# Patient Record
Sex: FEMALE | Race: WHITE | NOT HISPANIC OR LATINO | Employment: UNEMPLOYED | URBAN - NONMETROPOLITAN AREA
[De-identification: names, ages, dates, MRNs, and addresses within clinical notes are randomized per-mention and may not be internally consistent; named-entity substitution may affect disease eponyms.]

---

## 2017-02-06 ENCOUNTER — TRANSCRIBE ORDERS (OUTPATIENT)
Dept: ADMINISTRATIVE | Facility: HOSPITAL | Age: 47
End: 2017-02-06

## 2017-02-06 ENCOUNTER — HOSPITAL ENCOUNTER (OUTPATIENT)
Dept: GENERAL RADIOLOGY | Facility: HOSPITAL | Age: 47
Discharge: HOME | End: 2017-02-06
Admitting: NURSE PRACTITIONER

## 2017-02-06 DIAGNOSIS — M25.562 ACUTE PAIN OF LEFT KNEE: Primary | ICD-10-CM

## 2017-02-06 DIAGNOSIS — M79.605 LEFT LEG PAIN: ICD-10-CM

## 2017-02-06 PROCEDURE — 73562 X-RAY EXAM OF KNEE 3: CPT

## 2017-02-06 PROCEDURE — 73590 X-RAY EXAM OF LOWER LEG: CPT

## 2017-03-23 ENCOUNTER — TRANSCRIBE ORDERS (OUTPATIENT)
Dept: NUCLEAR MEDICINE | Facility: HOSPITAL | Age: 47
End: 2017-03-23

## 2017-03-23 DIAGNOSIS — Z96.652 PRESENCE OF LEFT ARTIFICIAL KNEE JOINT: Primary | ICD-10-CM

## 2017-04-20 ENCOUNTER — TRANSCRIBE ORDERS (OUTPATIENT)
Dept: MAMMOGRAPHY | Facility: HOSPITAL | Age: 47
End: 2017-04-20

## 2017-04-20 DIAGNOSIS — Z13.9 SCREENING: Primary | ICD-10-CM

## 2017-04-27 ENCOUNTER — TRANSCRIBE ORDERS (OUTPATIENT)
Dept: CT IMAGING | Facility: HOSPITAL | Age: 47
End: 2017-04-27

## 2017-04-27 ENCOUNTER — HOSPITAL ENCOUNTER (OUTPATIENT)
Dept: CT IMAGING | Facility: HOSPITAL | Age: 47
Discharge: HOME | End: 2017-04-27
Admitting: NURSE PRACTITIONER

## 2017-04-27 DIAGNOSIS — R10.84 ABDOMINAL PAIN, GENERALIZED: Primary | ICD-10-CM

## 2017-04-27 PROCEDURE — 74177 CT ABD & PELVIS W/CONTRAST: CPT

## 2017-04-27 PROCEDURE — 0 DIATRIZOATE MEGLUMINE & SODIUM PER 1 ML: Performed by: NURSE PRACTITIONER

## 2017-04-27 PROCEDURE — 0 IOPAMIDOL 61 % SOLUTION: Performed by: NURSE PRACTITIONER

## 2017-04-27 RX ADMIN — IOPAMIDOL 100 ML: 612 INJECTION, SOLUTION INTRAVENOUS at 16:15

## 2017-04-27 RX ADMIN — DIATRIZOATE MEGLUMINE AND DIATRIZOATE SODIUM 30 ML: 660; 100 LIQUID ORAL; RECTAL at 16:15

## 2017-05-09 ENCOUNTER — OFFICE VISIT (OUTPATIENT)
Dept: GASTROENTEROLOGY | Facility: CLINIC | Age: 47
End: 2017-05-09

## 2017-05-09 VITALS
DIASTOLIC BLOOD PRESSURE: 91 MMHG | SYSTOLIC BLOOD PRESSURE: 146 MMHG | TEMPERATURE: 97.8 F | BODY MASS INDEX: 42.86 KG/M2 | HEART RATE: 91 BPM | WEIGHT: 227 LBS | HEIGHT: 61 IN | RESPIRATION RATE: 16 BRPM

## 2017-05-09 DIAGNOSIS — R93.2 ABNORMAL CT OF LIVER: Chronic | ICD-10-CM

## 2017-05-09 DIAGNOSIS — R10.32 LEFT LOWER QUADRANT PAIN: ICD-10-CM

## 2017-05-09 DIAGNOSIS — R19.7 DIARRHEA, UNSPECIFIED TYPE: Chronic | ICD-10-CM

## 2017-05-09 DIAGNOSIS — R10.13 EPIGASTRIC PAIN: Primary | ICD-10-CM

## 2017-05-09 DIAGNOSIS — R12 HEARTBURN: Chronic | ICD-10-CM

## 2017-05-09 DIAGNOSIS — R11.0 NAUSEA: ICD-10-CM

## 2017-05-09 PROCEDURE — 99244 OFF/OP CNSLTJ NEW/EST MOD 40: CPT | Performed by: NURSE PRACTITIONER

## 2017-05-09 RX ORDER — ONDANSETRON 4 MG/1
4 TABLET, FILM COATED ORAL EVERY 8 HOURS PRN
COMMUNITY
End: 2020-04-14

## 2017-05-09 RX ORDER — HYDROCODONE BITARTRATE AND ACETAMINOPHEN 7.5; 325 MG/1; MG/1
1 TABLET ORAL EVERY 6 HOURS PRN
COMMUNITY
End: 2020-04-14

## 2017-05-09 RX ORDER — PROMETHAZINE HYDROCHLORIDE 25 MG/1
25 TABLET ORAL EVERY 6 HOURS PRN
COMMUNITY
End: 2020-04-14

## 2017-05-09 RX ORDER — CIPROFLOXACIN 500 MG/1
500 TABLET, FILM COATED ORAL 2 TIMES DAILY
Qty: 14 TABLET | Refills: 0 | Status: SHIPPED | OUTPATIENT
Start: 2017-05-09 | End: 2017-05-16

## 2017-05-09 RX ORDER — METRONIDAZOLE 250 MG/1
250 TABLET ORAL 4 TIMES DAILY
Qty: 28 TABLET | Refills: 0 | Status: SHIPPED | OUTPATIENT
Start: 2017-05-09 | End: 2017-05-16

## 2017-05-09 RX ORDER — OMEPRAZOLE 20 MG/1
20 CAPSULE, DELAYED RELEASE ORAL DAILY
COMMUNITY
End: 2020-04-14

## 2017-05-10 ENCOUNTER — PREP FOR SURGERY (OUTPATIENT)
Dept: GASTROENTEROLOGY | Facility: CLINIC | Age: 47
End: 2017-05-10

## 2017-05-10 DIAGNOSIS — R12 HEARTBURN: ICD-10-CM

## 2017-05-10 DIAGNOSIS — R19.7 DIARRHEA, UNSPECIFIED TYPE: ICD-10-CM

## 2017-05-10 DIAGNOSIS — R11.0 NAUSEA: ICD-10-CM

## 2017-05-10 DIAGNOSIS — R10.32 LEFT LOWER QUADRANT PAIN: Primary | ICD-10-CM

## 2017-05-10 DIAGNOSIS — R10.13 EPIGASTRIC PAIN: Primary | ICD-10-CM

## 2017-05-10 RX ORDER — SODIUM CHLORIDE 9 MG/ML
70 INJECTION, SOLUTION INTRAVENOUS CONTINUOUS PRN
Status: CANCELLED | OUTPATIENT
Start: 2017-05-10

## 2017-05-10 RX ORDER — SODIUM CHLORIDE 0.9 % (FLUSH) 0.9 %
1-10 SYRINGE (ML) INJECTION AS NEEDED
Status: CANCELLED | OUTPATIENT
Start: 2017-05-10

## 2017-05-11 ENCOUNTER — HOSPITAL ENCOUNTER (OUTPATIENT)
Facility: HOSPITAL | Age: 47
Setting detail: HOSPITAL OUTPATIENT SURGERY
End: 2017-05-11
Attending: INTERNAL MEDICINE | Admitting: INTERNAL MEDICINE

## 2017-05-13 ENCOUNTER — HOSPITAL ENCOUNTER (OUTPATIENT)
Dept: MAMMOGRAPHY | Facility: HOSPITAL | Age: 47
Discharge: HOME | End: 2017-05-13

## 2017-05-15 ENCOUNTER — HOSPITAL ENCOUNTER (OUTPATIENT)
Dept: MAMMOGRAPHY | Facility: HOSPITAL | Age: 47
Discharge: HOME | End: 2017-05-15
Admitting: NURSE PRACTITIONER

## 2017-05-15 ENCOUNTER — TELEPHONE (OUTPATIENT)
Dept: GASTROENTEROLOGY | Facility: CLINIC | Age: 47
End: 2017-05-15

## 2017-05-15 DIAGNOSIS — Z13.9 SCREENING: ICD-10-CM

## 2017-05-15 PROCEDURE — G0202 SCR MAMMO BI INCL CAD: HCPCS

## 2017-05-15 PROCEDURE — 77063 BREAST TOMOSYNTHESIS BI: CPT

## 2017-05-23 VITALS — WEIGHT: 227 LBS | HEIGHT: 61 IN | BODY MASS INDEX: 42.86 KG/M2

## 2017-05-23 RX ORDER — ALBUTEROL SULFATE 90 UG/1
2 AEROSOL, METERED RESPIRATORY (INHALATION) EVERY 4 HOURS PRN
COMMUNITY
End: 2018-11-28 | Stop reason: HOSPADM

## 2017-06-14 DIAGNOSIS — N20.0 KIDNEY STONE: Primary | ICD-10-CM

## 2017-08-15 ENCOUNTER — HOSPITAL ENCOUNTER (EMERGENCY)
Facility: HOSPITAL | Age: 47
Discharge: HOME | End: 2017-08-15
Attending: EMERGENCY MEDICINE | Admitting: EMERGENCY MEDICINE

## 2017-08-15 ENCOUNTER — APPOINTMENT (OUTPATIENT)
Dept: GENERAL RADIOLOGY | Facility: HOSPITAL | Age: 47
End: 2017-08-15

## 2017-08-15 VITALS
DIASTOLIC BLOOD PRESSURE: 78 MMHG | SYSTOLIC BLOOD PRESSURE: 127 MMHG | RESPIRATION RATE: 18 BRPM | HEART RATE: 84 BPM | WEIGHT: 215 LBS | BODY MASS INDEX: 40.59 KG/M2 | OXYGEN SATURATION: 98 % | TEMPERATURE: 98.5 F | HEIGHT: 61 IN

## 2017-08-15 DIAGNOSIS — M25.571 RIGHT ANKLE PAIN, UNSPECIFIED CHRONICITY: Primary | ICD-10-CM

## 2017-08-15 PROCEDURE — 99283 EMERGENCY DEPT VISIT LOW MDM: CPT

## 2017-08-15 PROCEDURE — 73610 X-RAY EXAM OF ANKLE: CPT

## 2017-08-15 RX ORDER — MELOXICAM 7.5 MG/1
7.5 TABLET ORAL DAILY
Qty: 14 TABLET | Refills: 0 | Status: SHIPPED | OUTPATIENT
Start: 2017-08-15 | End: 2020-04-14

## 2017-08-15 NOTE — ED PROVIDER NOTES
Subjective   History of Present Illness  This is a 47-year-old female who comes in today complaining of right foot pain.  She states that she twisted her foot approximately one week ago and the pain has coming increasingly worse over the past few days radiating up into her ankle and into her thigh.  She denies any other injury.  Review of Systems   Constitutional: Negative.    HENT: Negative.    Eyes: Negative.    Respiratory: Negative.    Cardiovascular: Negative.    Gastrointestinal: Negative.    Genitourinary: Negative.    Musculoskeletal: Positive for joint swelling and myalgias.   Skin: Negative.    Neurological: Negative.    Psychiatric/Behavioral: Negative.    All other systems reviewed and are negative.      Past Medical History:   Diagnosis Date   • Arthritis    • B12 deficiency    • Back pain    • Bronchitis    • Depression    • Diverticulosis    • Fatty liver    • Generalized headaches    • GERD (gastroesophageal reflux disease)    • High cholesterol    • Pancreatitis    • Pneumonia    • Wears dentures     UPPER       No Known Allergies    Past Surgical History:   Procedure Laterality Date   • APPENDECTOMY     • CHOLECYSTECTOMY     • DIAGNOSTIC LAPAROSCOPY     • HYSTERECTOMY  2017   • REPLACEMENT TOTAL KNEE      left   • SALPINGO OOPHORECTOMY Left    • UPPER GASTROINTESTINAL ENDOSCOPY  27 years ago       Family History   Problem Relation Age of Onset   • Liver disease Father    • Kidney cancer Father    • Colon cancer Maternal Grandfather        Social History     Social History   • Marital status:      Spouse name: N/A   • Number of children: N/A   • Years of education: N/A     Social History Main Topics   • Smoking status: Current Every Day Smoker     Packs/day: 0.50     Years: 17.00     Types: Cigarettes   • Smokeless tobacco: Never Used   • Alcohol use Yes      Comment: social   • Drug use: No   • Sexual activity: Defer     Other Topics Concern   • None     Social History Narrative            Objective   Physical Exam   Constitutional: She appears well-developed and well-nourished.   Nursing note and vitals reviewed.  GEN: No acute distress  Head: Normocephalic, atraumatic  Eyes: Pupils equal round reactive to light  ENT: Posterior pharynx normal in appearance, oral mucosa is moist  Chest: Nontender to palpation  Cardiovascular: Regular rate  Lungs: Clear to auscultation bilaterally  Abdomen: Soft, nontender, nondistended, no peritoneal signs  Extremities: No edema, normal appearance tender to lateral aspect of foot at 5th metacarpal.   Neuro: GCS 15  Psych: Mood and affect are appropriate      Procedures         ED Course  ED Course                  MDM  Number of Diagnoses or Management Options  Right ankle pain, unspecified chronicity:   Diagnosis management comments: Patient does report limping I do feel that the change in her gait has caused the tenderness to her calf and thigh and she does agree with this theory.  We will go ahead and put her in a boot as her x-ray is negative and give her some anti-inflammatories at this time.  Can have her follow up with her primary care provider      Final diagnoses:   Right ankle pain, unspecified chronicity            Mirna Oglesby, APRN  08/15/17 7318

## 2018-04-02 ENCOUNTER — APPOINTMENT (OUTPATIENT)
Dept: CT IMAGING | Facility: HOSPITAL | Age: 48
End: 2018-04-02

## 2018-04-02 ENCOUNTER — HOSPITAL ENCOUNTER (EMERGENCY)
Facility: HOSPITAL | Age: 48
Discharge: HOME | End: 2018-04-02
Attending: EMERGENCY MEDICINE | Admitting: EMERGENCY MEDICINE

## 2018-04-02 VITALS
DIASTOLIC BLOOD PRESSURE: 84 MMHG | HEART RATE: 70 BPM | HEIGHT: 61 IN | OXYGEN SATURATION: 98 % | BODY MASS INDEX: 33.99 KG/M2 | RESPIRATION RATE: 18 BRPM | SYSTOLIC BLOOD PRESSURE: 122 MMHG | TEMPERATURE: 98 F | WEIGHT: 180 LBS

## 2018-04-02 DIAGNOSIS — D23.4 CYST, DERMOID, SCALP AND NECK: Primary | ICD-10-CM

## 2018-04-02 PROCEDURE — 70450 CT HEAD/BRAIN W/O DYE: CPT

## 2018-04-02 PROCEDURE — 99283 EMERGENCY DEPT VISIT LOW MDM: CPT

## 2018-04-02 RX ORDER — IBUPROFEN 800 MG/1
800 TABLET ORAL ONCE
Status: COMPLETED | OUTPATIENT
Start: 2018-04-02 | End: 2018-04-02

## 2018-04-02 RX ORDER — CEPHALEXIN 500 MG/1
500 CAPSULE ORAL 3 TIMES DAILY
Qty: 21 CAPSULE | Refills: 0 | Status: SHIPPED | OUTPATIENT
Start: 2018-04-02 | End: 2018-04-09

## 2018-04-02 RX ADMIN — IBUPROFEN 800 MG: 800 TABLET, FILM COATED ORAL at 17:32

## 2018-05-08 ENCOUNTER — TRANSCRIBE ORDERS (OUTPATIENT)
Dept: ADMINISTRATIVE | Facility: HOSPITAL | Age: 48
End: 2018-05-08

## 2018-05-08 DIAGNOSIS — Z12.39 SCREENING BREAST EXAMINATION: Primary | ICD-10-CM

## 2018-06-11 NOTE — ED PROVIDER NOTES
Subjective   47-year-old female presents with pain in the back of her head.  She has a knot in the left back of her head that she's noticed for 20 days.  It's tender to touch.  The pain is worse with movement.  No fever or chills.  No injury to the area.        History provided by:  Patient   used: No        Review of Systems   Neurological: Positive for headaches.   All other systems reviewed and are negative.      Past Medical History:   Diagnosis Date   • Arthritis    • B12 deficiency    • Back pain    • Bronchitis    • Depression    • Diverticulosis    • Fatty liver    • Generalized headaches    • GERD (gastroesophageal reflux disease)    • High cholesterol    • Pancreatitis    • Pneumonia    • Wears dentures     UPPER       No Known Allergies    Past Surgical History:   Procedure Laterality Date   • APPENDECTOMY     • CHOLECYSTECTOMY     • DIAGNOSTIC LAPAROSCOPY     • HYSTERECTOMY  2017   • REPLACEMENT TOTAL KNEE      left   • SALPINGO OOPHORECTOMY Left    • UPPER GASTROINTESTINAL ENDOSCOPY  27 years ago       Family History   Problem Relation Age of Onset   • Liver disease Father    • Kidney cancer Father    • Colon cancer Maternal Grandfather        Social History     Social History   • Marital status:      Social History Main Topics   • Smoking status: Current Every Day Smoker     Packs/day: 0.50     Years: 17.00     Types: Cigarettes   • Smokeless tobacco: Never Used   • Alcohol use Yes      Comment: social   • Drug use: No   • Sexual activity: Defer     Other Topics Concern   • Not on file           Objective   Physical Exam   Constitutional: She is oriented to person, place, and time. She appears well-developed and well-nourished.   Eyes: EOM are normal.   Neck: Normal range of motion. Neck supple.   Cardiovascular: Normal rate and regular rhythm.    Pulmonary/Chest: Effort normal and breath sounds normal.   Abdominal: Soft. Bowel sounds are normal.   Musculoskeletal: Normal  range of motion.   Neurological: She is alert and oriented to person, place, and time. She has normal reflexes.   Skin:   2 cm cyst on the left occipital area.  Tender to palpation.   Psychiatric: She has a normal mood and affect.   Nursing note and vitals reviewed.      Procedures         ED Course  ED Course                  MDM    Final diagnoses:   Cyst, dermoid, scalp and neck            Leon Laureano Jr., PA-C  04/02/18 [id number]     [phone]

## 2018-07-30 ENCOUNTER — HOSPITAL ENCOUNTER (EMERGENCY)
Facility: HOSPITAL | Age: 48
Discharge: LEFT BEFORE DISCHARGE | End: 2018-07-30

## 2018-07-30 VITALS
WEIGHT: 175 LBS | RESPIRATION RATE: 18 BRPM | HEIGHT: 61 IN | HEART RATE: 91 BPM | SYSTOLIC BLOOD PRESSURE: 122 MMHG | TEMPERATURE: 98.8 F | BODY MASS INDEX: 33.04 KG/M2 | DIASTOLIC BLOOD PRESSURE: 78 MMHG | OXYGEN SATURATION: 98 %

## 2018-07-31 ENCOUNTER — TRANSCRIBE ORDERS (OUTPATIENT)
Dept: ULTRASOUND IMAGING | Facility: HOSPITAL | Age: 48
End: 2018-07-31

## 2018-07-31 ENCOUNTER — HOSPITAL ENCOUNTER (OUTPATIENT)
Dept: ULTRASOUND IMAGING | Facility: HOSPITAL | Age: 48
Discharge: HOME | End: 2018-07-31
Admitting: NURSE PRACTITIONER

## 2018-07-31 DIAGNOSIS — R60.0 LEG EDEMA, RIGHT: ICD-10-CM

## 2018-07-31 DIAGNOSIS — R60.0 LEG EDEMA, RIGHT: Primary | ICD-10-CM

## 2018-07-31 PROCEDURE — 93971 EXTREMITY STUDY: CPT

## 2020-04-14 ENCOUNTER — OFFICE VISIT (OUTPATIENT)
Dept: CARDIOLOGY | Facility: CLINIC | Age: 50
End: 2020-04-14

## 2020-04-14 VITALS
BODY MASS INDEX: 40.75 KG/M2 | DIASTOLIC BLOOD PRESSURE: 90 MMHG | TEMPERATURE: 98.2 F | HEIGHT: 61 IN | HEART RATE: 91 BPM | OXYGEN SATURATION: 96 % | WEIGHT: 215.8 LBS | SYSTOLIC BLOOD PRESSURE: 137 MMHG

## 2020-04-14 DIAGNOSIS — R00.2 PALPITATIONS: ICD-10-CM

## 2020-04-14 DIAGNOSIS — R07.89 OTHER CHEST PAIN: Primary | ICD-10-CM

## 2020-04-14 DIAGNOSIS — R60.9 PERIPHERAL EDEMA: ICD-10-CM

## 2020-04-14 DIAGNOSIS — Z00.00 HEALTHCARE MAINTENANCE: ICD-10-CM

## 2020-04-14 DIAGNOSIS — F17.200 SMOKING: ICD-10-CM

## 2020-04-14 DIAGNOSIS — E78.5 HYPERLIPIDEMIA, UNSPECIFIED HYPERLIPIDEMIA TYPE: ICD-10-CM

## 2020-04-14 DIAGNOSIS — R06.02 SHORTNESS OF BREATH: ICD-10-CM

## 2020-04-14 DIAGNOSIS — Z82.49 FAMILY HISTORY OF EARLY CAD: ICD-10-CM

## 2020-04-14 DIAGNOSIS — R42 LIGHTHEADEDNESS: ICD-10-CM

## 2020-04-14 DIAGNOSIS — E66.01 MORBIDLY OBESE (HCC): ICD-10-CM

## 2020-04-14 PROBLEM — IMO0001 SMOKING: Status: ACTIVE | Noted: 2020-04-14

## 2020-04-14 PROBLEM — J45.909 ASTHMA: Status: ACTIVE | Noted: 2020-04-14

## 2020-04-14 PROBLEM — R73.03 BORDERLINE TYPE 2 DIABETES MELLITUS: Status: ACTIVE | Noted: 2020-04-14

## 2020-04-14 PROBLEM — R60.0 PERIPHERAL EDEMA: Status: ACTIVE | Noted: 2020-04-14

## 2020-04-14 PROCEDURE — 99204 OFFICE O/P NEW MOD 45 MIN: CPT | Performed by: NURSE PRACTITIONER

## 2020-04-14 RX ORDER — ATORVASTATIN CALCIUM 40 MG/1
40 TABLET, FILM COATED ORAL DAILY
Qty: 90 TABLET | Refills: 3 | Status: SHIPPED | OUTPATIENT
Start: 2020-04-14 | End: 2022-07-22

## 2020-04-14 RX ORDER — NITROGLYCERIN 0.4 MG/1
TABLET SUBLINGUAL
Qty: 30 TABLET | Refills: 5 | Status: SHIPPED | OUTPATIENT
Start: 2020-04-14 | End: 2022-08-10 | Stop reason: SDUPTHER

## 2020-04-14 RX ORDER — IBUPROFEN 800 MG/1
800 TABLET ORAL 2 TIMES DAILY PRN
COMMUNITY
Start: 2020-01-21 | End: 2022-07-22

## 2020-04-14 NOTE — PATIENT INSTRUCTIONS
"BMI for Adults    Body mass index (BMI) is a number that is calculated from a person's weight and height. BMI may help to estimate how much of a person's weight is composed of fat. BMI can help identify those who may be at higher risk for certain medical problems.  How is BMI used with adults?  BMI is used as a screening tool to identify possible weight problems. It is used to check whether a person is obese, overweight, healthy weight, or underweight.  How is BMI calculated?  BMI measures your weight and compares it to your height. This can be done either in English ([place] or metric measurements. Note that charts are available to help you find your BMI quickly and easily without having to do these calculations yourself.  To calculate your BMI in English ([place] measurements, your health care provider will:  1. Measure your weight in pounds (lb).  2. Multiply the number of pounds by 703.  ? For example, for a person who weighs 180 lb, multiply that number by 703, which equals 126,540.  3. Measure your height in inches (in). Then multiply that number by itself to get a measurement called \"inches squared.\"  ? For example, for a person who is 70 in tall, the \"inches squared\" measurement is 70 in x 70 in, which equals 4900 inches squared.  4. Divide the total from Step 2 (number of lb x 703) by the total from Step 3 (inches squared): 126,540 ÷ 4900 = 25.8. This is your BMI.  To calculate your BMI in metric measurements, your health care provider will:  1. Measure your weight in kilograms (kg).  2. Measure your height in meters (m). Then multiply that number by itself to get a measurement called \"meters squared.\"  ? For example, for a person who is 1.75 m tall, the \"meters squared\" measurement is 1.75 m x 1.75 m, which is equal to 3.1 meters squared.  3. Divide the number of kilograms (your weight) by the meters squared number. In this example: 70 ÷ 3.1 = 22.6. This is your BMI.  How is BMI interpreted?  To interpret your " results, your health care provider will use BMI charts to identify whether you are underweight, normal weight, overweight, or obese. The following guidelines will be used:  · Underweight: BMI less than 18.5.  · Normal weight: BMI between 18.5 and 24.9.  · Overweight: BMI between 25 and 29.9.  · Obese: BMI of 30 and above.  Please note:  · Weight includes both fat and muscle, so someone with a muscular build, such as an athlete, may have a BMI that is higher than 24.9. In cases like these, BMI is not an accurate measure of body fat.  · To determine if excess body fat is the cause of a BMI of 25 or higher, further assessments may need to be done by a health care provider.  · BMI is usually interpreted in the same way for men and women.  Why is BMI a useful tool?  BMI is useful in two ways:  · Identifying a weight problem that may be related to a medical condition, or that may increase the risk for medical problems.  · Promoting lifestyle and diet changes in order to reach a healthy weight.  Summary  · Body mass index (BMI) is a number that is calculated from a person's weight and height.  · BMI may help to estimate how much of a person's weight is composed of fat. BMI can help identify those who may be at higher risk for certain medical problems.  · BMI can be measured using English measurements or metric measurements.  · To interpret your results, your health care provider will use BMI charts to identify whether you are underweight, normal weight, overweight, or obese.  This information is not intended to replace advice given to you by your health care provider. Make sure you discuss any questions you have with your health care provider.  Document Released: 08/29/2005 Document Revised: 10/31/2018 Document Reviewed: 10/31/2018  [employer] Interactive Patient Education © 2020 [employer]  For more information:    Quit Now [place]  1-800-QUIT-NOW  https://[place].quitlogix.org/en-[place]/  Steps to Quit Smoking  Smoking  tobacco can be harmful to your health and can affect almost every organ in your body. Smoking puts you, and those around you, at risk for developing many serious chronic diseases. Quitting smoking is difficult, but it is one of the best things that you can do for your health. It is never too late to quit.  What are the benefits of quitting smoking?  When you quit smoking, you lower your risk of developing serious diseases and conditions, such as:  · Lung cancer or lung disease, such as COPD.  · Heart disease.  · Stroke.  · Heart attack.  · Infertility.  · Osteoporosis and bone fractures.  Additionally, symptoms such as coughing, wheezing, and shortness of breath may get better when you quit. You may also find that you get sick less often because your body is stronger at fighting off colds and infections. If you are pregnant, quitting smoking can help to reduce your chances of having a baby of low birth weight.  How do I get ready to quit?  When you decide to quit smoking, create a plan to make sure that you are successful. Before you quit:  · Pick a date to quit. Set a date within the next two weeks to give you time to prepare.  · Write down the reasons why you are quitting. Keep this list in places where you will see it often, such as on your bathroom mirror or in your car or wallet.  · Identify the people, places, things, and activities that make you want to smoke (triggers) and avoid them. Make sure to take these actions:  ¨ Throw away all cigarettes at home, at work, and in your car.  ¨ Throw away smoking accessories, such as ashtrays and lighters.  ¨ Clean your car and make sure to empty the ashtray.  ¨ Clean your home, including curtains and carpets.  · Tell your family, friends, and coworkers that you are quitting. Support from your loved ones can make quitting easier.  · Talk with your health care provider about your options for quitting smoking.  · Find out what treatment options are covered by your health  insurance.  What strategies can I use to quit smoking?  Talk with your healthcare provider about different strategies to quit smoking. Some strategies include:  · Quitting smoking altogether instead of gradually lessening how much you smoke over a period of time. Research shows that quitting “cold turkey” is more successful than gradually quitting.  · Attending in-person counseling to help you build problem-solving skills. You are more likely to have success in quitting if you attend several counseling sessions. Even short sessions of 10 minutes can be effective.  · Finding resources and support systems that can help you to quit smoking and remain smoke-free after you quit. These resources are most helpful when you use them often. They can include:  ¨ Online chats with a counselor.  ¨ Telephone quitlines.  ¨ Printed self-help materials.  ¨ Support groups or group counseling.  ¨ Text messaging programs.  ¨ Mobile phone applications.  · Taking medicines to help you quit smoking. (If you are pregnant or breastfeeding, talk with your health care provider first.) Some medicines contain nicotine and some do not. Both types of medicines help with cravings, but the medicines that include nicotine help to relieve withdrawal symptoms. Your health care provider may recommend:  ¨ Nicotine patches, gum, or lozenges.  ¨ Nicotine inhalers or sprays.  ¨ Non-nicotine medicine that is taken by mouth.  Talk with your health care provider about combining strategies, such as taking medicines while you are also receiving in-person counseling. Using these two strategies together makes you more likely to succeed in quitting than if you used either strategy on its own.  If you are pregnant or breastfeeding, talk with your health care provider about finding counseling or other support strategies to quit smoking. Do not take medicine to help you quit smoking unless told to do so by your health care provider.  What things can I do to make it  easier to quit?  Quitting smoking might feel overwhelming at first, but there is a lot that you can do to make it easier. Take these important actions:  · Reach out to your family and friends and ask that they support and encourage you during this time. Call telephone quitlines, reach out to support groups, or work with a counselor for support.  · Ask people who smoke to avoid smoking around you.  · Avoid places that trigger you to smoke, such as bars, parties, or smoke-break areas at work.  · Spend time around people who do not smoke.  · Lessen stress in your life, because stress can be a smoking trigger for some people. To lessen stress, try:  ¨ Exercising regularly.  ¨ Deep-breathing exercises.  ¨ Yoga.  ¨ Meditating.  ¨ Performing a body scan. This involves closing your eyes, scanning your body from head to toe, and noticing which parts of your body are particularly tense. Purposefully relax the muscles in those areas.  · Download or purchase mobile phone or tablet apps (applications) that can help you stick to your quit plan by providing reminders, tips, and encouragement. There are many free apps, such as QuitGuide from the CDC (Centers for Disease Control and Prevention). You can find other support for quitting smoking (smoking cessation) through smokefree.gov and other websites.  How will I feel when I quit smoking?  Within the first 24 hours of quitting smoking, you may start to feel some withdrawal symptoms. These symptoms are usually most noticeable 2-3 days after quitting, but they usually do not last beyond 2-3 weeks. Changes or symptoms that you might experience include:  · Mood swings.  · Restlessness, anxiety, or irritation.  · Difficulty concentrating.  · Dizziness.  · Strong cravings for sugary foods in addition to nicotine.  · Mild weight gain.  · Constipation.  · Nausea.  · Coughing or a sore throat.  · Changes in how your medicines work in your body.  · A depressed mood.  · Difficulty sleeping  (insomnia).  After the first 2-3 weeks of quitting, you may start to notice more positive results, such as:  · Improved sense of smell and taste.  · Decreased coughing and sore throat.  · Slower heart rate.  · Lower blood pressure.  · Clearer skin.  · The ability to breathe more easily.  · Fewer sick days.  Quitting smoking is very challenging for most people. Do not get discouraged if you are not successful the first time. Some people need to make many attempts to quit before they achieve long-term success. Do your best to stick to your quit plan, and talk with your health care provider if you have any questions or concerns.  This information is not intended to replace advice given to you by your health care provider. Make sure you discuss any questions you have with your health care provider.  Document Released: 12/12/2002 Document Revised: 08/15/2017 Document Reviewed: 05/03/2016  [employer] Interactive Patient Education © 2017 [employer]    Nonspecific Chest Pain  Chest pain can be caused by many different conditions. Some causes of chest pain can be life-threatening. These will require treatment right away. Serious causes of chest pain include:  · Heart attack.  · A tear in the body's main blood vessel.  · Redness and swelling (inflammation) around your heart.  · Blood clot in your lungs.  Other causes of chest pain may not be so serious. These include:  · Heartburn.  · Anxiety or stress.  · Damage to bones or muscles in your chest.  · Lung infections.  Chest pain can feel like:  · Pain or discomfort in your chest.  · Crushing, pressure, aching, or squeezing pain.  · Burning or tingling.  · Dull or sharp pain that is worse when you move, cough, or take a deep breath.  · Pain or discomfort that is also felt in your back, neck, jaw, shoulder, or arm, or pain that spreads to any of these areas.  It is hard to know whether your pain is caused by something that is serious or something that is not so serious. So it  is important to see your doctor right away if you have chest pain.  Follow these instructions at home:  Medicines  · Take over-the-counter and prescription medicines only as told by your doctor.  · If you were prescribed an antibiotic medicine, take it as told by your doctor. Do not stop taking the antibiotic even if you start to feel better.  Lifestyle    · Rest as told by your doctor.  · Do not use any products that contain nicotine or tobacco, such as cigarettes, e-cigarettes, and chewing tobacco. If you need help quitting, ask your doctor.  · Do not drink alcohol.  · Make lifestyle changes as told by your doctor. These may include:  ? Getting regular exercise. Ask your doctor what activities are safe for you.  ? Eating a heart-healthy diet. A diet and nutrition specialist (dietitian) can help you to learn healthy eating options.  ? Staying at a healthy weight.  ? Treating diabetes or high blood pressure, if needed.  ? Lowering your stress. Activities such as yoga and relaxation techniques can help.  General instructions  · Pay attention to any changes in your symptoms. Tell your doctor about them or any new symptoms.  · Avoid any activities that cause chest pain.  · Keep all follow-up visits as told by your doctor. This is important. You may need more testing if your chest pain does not go away.  Contact a doctor if:  · Your chest pain does not go away.  · You feel depressed.  · You have a fever.  Get help right away if:  · Your chest pain is worse.  · You have a cough that gets worse, or you cough up blood.  · You have very bad (severe) pain in your belly (abdomen).  · You pass out (faint).  · You have either of these for no clear reason:  ? Sudden chest discomfort.  ? Sudden discomfort in your arms, back, neck, or jaw.  · You have shortness of breath at any time.  · You suddenly start to sweat, or your skin gets clammy.  · You feel sick to your stomach (nauseous).  · You throw up (vomit).  · You suddenly feel  lightheaded or dizzy.  · You feel very weak or tired.  · Your heart starts to beat fast, or it feels like it is skipping beats.  These symptoms may be an emergency. Do not wait to see if the symptoms will go away. Get medical help right away. Call your local emergency services (911 in the [place] Do not drive yourself to the hospital.  Summary  · Chest pain can be caused by many different conditions. The cause may be serious and need treatment right away. If you have chest pain, see your doctor right away.  · Follow your doctor's instructions for taking medicines and making lifestyle changes.  · Keep all follow-up visits as told by your doctor. This includes visits for any further testing if your chest pain does not go away.  · Be sure to know the signs that show that your condition has become worse. Get help right away if you have these symptoms.  This information is not intended to replace advice given to you by your health care provider. Make sure you discuss any questions you have with your health care provider.  Document Released: 06/05/2009 Document Revised: 06/20/2019 Document Reviewed: 06/20/2019  [employer] Interactive Patient Education © 2020 [employer]    Palpitations  Palpitations are feelings that your heartbeat is irregular or is faster than normal. It may feel like your heart is fluttering or skipping a beat. Palpitations are usually not a serious problem. They may be caused by many things, including smoking, caffeine, alcohol, stress, and certain medicines or drugs. Most causes of palpitations are not serious. However, some palpitations can be a sign of a serious problem. You may need further tests to rule out serious medical problems.  Follow these instructions at home:         Pay attention to any changes in your condition. Take these actions to help manage your symptoms:  Eating and drinking  · Avoid foods and drinks that may cause palpitations. These may include:  ? Caffeinated coffee, tea, soft  drinks, diet pills, and energy drinks.  ? Chocolate.  ? Alcohol.  Lifestyle  · Take steps to reduce your stress and anxiety. Things that can help you relax include:  ? Yoga.  ? Mind-body activities, such as deep breathing, meditation, or using words and images to create positive thoughts (guided imagery).  ? Physical activity, such as swimming, jogging, or walking. Tell your health care provider if your palpitations increase with activity. If you have chest pain or shortness of breath with activity, do not continue the activity until you are seen by your health care provider.  ? Biofeedback. This is a method that helps you learn to use your mind to control things in your body, such as your heartbeat.  · Do not use drugs, including cocaine or ecstasy. Do not use marijuana.  · Get plenty of rest and sleep. Keep a regular bed time.  General instructions  · Take over-the-counter and prescription medicines only as told by your health care provider.  · Do not use any products that contain nicotine or tobacco, such as cigarettes and e-cigarettes. If you need help quitting, ask your health care provider.  · Keep all follow-up visits as told by your health care provider. This is important. These may include visits for further testing if palpitations do not go away or get worse.  Contact a health care provider if you:  · Continue to have a fast or irregular heartbeat after 24 hours.  · Notice that your palpitations occur more often.  Get help right away if you:  · Have chest pain or shortness of breath.  · Have a severe headache.  · Feel dizzy or you faint.  Summary  · Palpitations are feelings that your heartbeat is irregular or is faster than normal. It may feel like your heart is fluttering or skipping a beat.  · Palpitations may be caused by many things, including smoking, caffeine, alcohol, stress, certain medicines, and drugs.  · Although most causes of palpitations are not serious, some causes can be a sign of a  serious medical problem.  · Get help right away if you faint or have chest pain, shortness of breath, a severe headache, or dizziness.  This information is not intended to replace advice given to you by your health care provider. Make sure you discuss any questions you have with your health care provider.  Document Released: 12/15/2001 Document Revised: 01/30/2019 Document Reviewed: 01/30/2019  [employer] Interactive Patient Education © 2020 [employer]    Nonspecific Chest Pain  Chest pain can be caused by many different conditions. Some causes of chest pain can be life-threatening. These will require treatment right away. Serious causes of chest pain include:  · Heart attack.  · A tear in the body's main blood vessel.  · Redness and swelling (inflammation) around your heart.  · Blood clot in your lungs.  Other causes of chest pain may not be so serious. These include:  · Heartburn.  · Anxiety or stress.  · Damage to bones or muscles in your chest.  · Lung infections.  Chest pain can feel like:  · Pain or discomfort in your chest.  · Crushing, pressure, aching, or squeezing pain.  · Burning or tingling.  · Dull or sharp pain that is worse when you move, cough, or take a deep breath.  · Pain or discomfort that is also felt in your back, neck, jaw, shoulder, or arm, or pain that spreads to any of these areas.  It is hard to know whether your pain is caused by something that is serious or something that is not so serious. So it is important to see your doctor right away if you have chest pain.  Follow these instructions at home:  Medicines  · Take over-the-counter and prescription medicines only as told by your doctor.  · If you were prescribed an antibiotic medicine, take it as told by your doctor. Do not stop taking the antibiotic even if you start to feel better.  Lifestyle    · Rest as told by your doctor.  · Do not use any products that contain nicotine or tobacco, such as cigarettes, e-cigarettes, and chewing  tobacco. If you need help quitting, ask your doctor.  · Do not drink alcohol.  · Make lifestyle changes as told by your doctor. These may include:  ? Getting regular exercise. Ask your doctor what activities are safe for you.  ? Eating a heart-healthy diet. A diet and nutrition specialist (dietitian) can help you to learn healthy eating options.  ? Staying at a healthy weight.  ? Treating diabetes or high blood pressure, if needed.  ? Lowering your stress. Activities such as yoga and relaxation techniques can help.  General instructions  · Pay attention to any changes in your symptoms. Tell your doctor about them or any new symptoms.  · Avoid any activities that cause chest pain.  · Keep all follow-up visits as told by your doctor. This is important. You may need more testing if your chest pain does not go away.  Contact a doctor if:  · Your chest pain does not go away.  · You feel depressed.  · You have a fever.  Get help right away if:  · Your chest pain is worse.  · You have a cough that gets worse, or you cough up blood.  · You have very bad (severe) pain in your belly (abdomen).  · You pass out (faint).  · You have either of these for no clear reason:  ? Sudden chest discomfort.  ? Sudden discomfort in your arms, back, neck, or jaw.  · You have shortness of breath at any time.  · You suddenly start to sweat, or your skin gets clammy.  · You feel sick to your stomach (nauseous).  · You throw up (vomit).  · You suddenly feel lightheaded or dizzy.  · You feel very weak or tired.  · Your heart starts to beat fast, or it feels like it is skipping beats.  These symptoms may be an emergency. Do not wait to see if the symptoms will go away. Get medical help right away. Call your local emergency services (911 in the [place] Do not drive yourself to the hospital.  Summary  · Chest pain can be caused by many different conditions. The cause may be serious and need treatment right away. If you have chest pain, see your  doctor right away.  · Follow your doctor's instructions for taking medicines and making lifestyle changes.  · Keep all follow-up visits as told by your doctor. This includes visits for any further testing if your chest pain does not go away.  · Be sure to know the signs that show that your condition has become worse. Get help right away if you have these symptoms.  This information is not intended to replace advice given to you by your health care provider. Make sure you discuss any questions you have with your health care provider.  Document Released: 06/05/2009 Document Revised: 06/20/2019 Document Reviewed: 06/20/2019  [employer] Interactive Patient Education © 2020 [employer]

## 2020-04-14 NOTE — PROGRESS NOTES
Brynn Marquez Clothier is a 49 y.o. female     Chief Complaint   Patient presents with   • Establish Care     patient appears in office today to [facility] cardiac care       HPI    Problem list:    1.  Chest pain  2.  Hyperlipidemia  3.  Palpitations  4.  Family history of early coronary artery disease  5.  Shortness of breath  6.  Borderline diabetes mellitus type 2  7.  Peripheral edema  8.  Smoking habituation    Patient is a 49-year-old female who presents today to [facility] care.  She says for approximately 3 weeks now she has had what she describes as almost a vice  feeling around her chest kind of just to the right of the mid sternum.  She says whenever it happens she will become nauseated, diaphoretic, lightheaded and increased shortness of breath.  She says it can occur anytime and she has been having it pretty much every day.  She says it will radiate into her neck and down to her elbow of her left arm.  Patient says she has heart racing whenever she ambulates.  She denies any dizziness, presyncope, syncope or PND.  She says for the last couple weeks she is woke up smothering.  She does get swelling around her ankles for the last 2 days now.  She says she does have shortness of breath that is been increased for the last 2-1/2 to 3 weeks.  She says just walking across her house will make her short of breath.  She says she has had increase in fatigue for 3 weeks.    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  She had a 16-year-old family member that passed away she was born with a birth defect and had surgery at birth, 8 years old and then had a stroke at 18 after having heart surgery    Current Outpatient Medications on File Prior to Visit   Medication Sig Dispense Refill   • ibuprofen (ADVIL,MOTRIN) 800 MG tablet Take 800 mg by mouth 2 (Two) Times a Day As Needed.     • aspirin 81 MG tablet Take 1 tablet by mouth Daily. 30 tablet 11   • [DISCONTINUED] HYDROcodone-acetaminophen (NORCO) 7.5-325 MG per tablet Take 1 tablet by mouth Every 6 (Six) Hours As Needed for Moderate Pain (4-6).     • [DISCONTINUED] meloxicam (MOBIC) 7.5 MG tablet Take 1 tablet by mouth Daily. 14 tablet 0   • [DISCONTINUED] omeprazole (priLOSEC) 20 MG capsule Take 20 mg by mouth Daily.     • [DISCONTINUED] ondansetron (ZOFRAN) 4 MG tablet Take 4 mg by mouth Every 8 (Eight) Hours As Needed for Nausea or Vomiting.     • [DISCONTINUED] OXYBUTYNIN CHLORIDE PO Take 10 mg by mouth Daily.     • [DISCONTINUED] promethazine (PHENERGAN) 25 MG tablet Take 25 mg by mouth Every 6 (Six) Hours As Needed for Nausea or Vomiting.       No current facility-administered medications on file prior to visit.        ALLERGIES    Patient has no known allergies.    Past Medical History:   Diagnosis Date   • Arthritis    • Asthma    • B12 deficiency    • Back pain    • Bronchitis    • Chronic kidney disease     kidney stones   • Depression    • Diverticulosis    • Fatty liver    • Generalized headaches    • GERD (gastroesophageal reflux disease)    • High cholesterol    • Hyperlipidemia    • Pancreatitis    • Pneumonia    • Wears dentures     UPPER       Social History     Socioeconomic History   • Marital status:      Spouse name: Not on file   • Number of children: Not on file   • Years of education: Not on file   • Highest education level: Not on file   Tobacco Use    • Smoking status: Current Every Day Smoker     Packs/day: 0.50     Years: 17.00     Pack years: 8.50     Types: Cigarettes   • Smokeless tobacco: Never Used   Substance and Sexual Activity   • Alcohol use: Yes     Comment: social   • Drug use: Never   • Sexual activity: Defer       Family History   Problem Relation Age of Onset   • Liver disease Father    • Kidney cancer Father    • Heart attack Father    • Hypertension Father    • Hyperlipidemia Father    • Colon cancer Maternal Grandfather    • Diabetes Mother        Review of Systems   Constitutional: Positive for diaphoresis (with CP ) and fatigue (easily fatigued; for about 3 weeks now). Negative for fever.   HENT: Negative for congestion, rhinorrhea, sneezing and sore throat.    Eyes: Positive for visual disturbance (wears reading glasses PRN).   Respiratory: Positive for chest tightness (occasional chest tightness/heaviness) and shortness of breath (easily SOA ; worse on exertion; 2 1/2 - 3 weeks; just walking across her house ). Negative for cough and wheezing.    Cardiovascular: Positive for chest pain (precordial pain 3 weeks ago vice  feeling midsternum, just to right; rad to neck and left arm to elbow; occurs at anytime for the past 2 weeks ), palpitations (occasional palpitations/flutters; would race if she walked short distance ) and leg swelling (around ankles for 2 days now).   Gastrointestinal: Positive for nausea (with CP ). Negative for abdominal pain, blood in stool, constipation, diarrhea and vomiting.        Horrible heartburn and belching, taken every kind of medication for it, but nothing helps, prilosec, tums, etc.    Endocrine: Negative for cold intolerance and heat intolerance.   Genitourinary: Negative for difficulty urinating, frequency, hematuria and urgency.   Musculoskeletal: Positive for arthralgias (joints), back pain (back pain from herniated disc) and neck pain (neck pain x2 weeks). Negative for neck stiffness.   Skin:  "Positive for rash (rash throughout body ; seeing PCP ). Negative for wound.   Allergic/Immunologic: Negative for environmental allergies and food allergies.   Neurological: Positive for light-headedness (with CP ). Negative for dizziness, syncope and weakness.   Hematological: Does not bruise/bleed easily.   Psychiatric/Behavioral: Positive for sleep disturbance (2-3 weeks waking up smothering/SOA). Negative for agitation and confusion. The patient is nervous/anxious (easily nervous/anxious).        Objective   /90 (BP Location: Right arm, Patient Position: Sitting)   Pulse 91   Temp 98.2 °F (36.8 °C)   Ht 154.9 cm (61\")   Wt 97.9 kg (215 lb 12.8 oz)   SpO2 96%   BMI 40.78 kg/m²   Vitals:    04/14/20 0841   BP: 137/90   BP Location: Right arm   Patient Position: Sitting   Pulse: 91   Temp: 98.2 °F (36.8 °C)   SpO2: 96%   Weight: 97.9 kg (215 lb 12.8 oz)   Height: 154.9 cm (61\")      Lab Results (most recent)     None        Physical Exam   Constitutional: She is oriented to person, place, and time. Vital signs are normal. She appears well-developed and well-nourished. She is active and cooperative.   HENT:   Head: Normocephalic.   Eyes: Lids are normal.   Neck: Normal carotid pulses, no hepatojugular reflux and no JVD present. Carotid bruit is not present.   Cardiovascular: Normal rate, regular rhythm and normal heart sounds.   Pulses:       Radial pulses are 2+ on the right side, and 2+ on the left side.        Dorsalis pedis pulses are 2+ on the right side, and 2+ on the left side.        Posterior tibial pulses are 2+ on the right side, and 2+ on the left side.   No edema BLE.   Pulmonary/Chest: Effort normal and breath sounds normal.   Abdominal: Normal appearance and bowel sounds are normal.   Neurological: She is alert and oriented to person, place, and time.   Skin: Skin is warm, dry and intact.   Psychiatric: She has a normal mood and affect. Her speech is normal and behavior is normal. Judgment " and thought content normal. Cognition and memory are normal.       Procedure   Procedures         Assessment/Plan      Diagnosis Plan   1. Other chest pain  Stress Test With Myocardial Perfusion One Day    Adult Transthoracic Echo Complete W/ Cont if Necessary Per Protocol    Cardiac Event Monitor    nitroglycerin (NITROSTAT) 0.4 MG SL tablet    aspirin 81 MG tablet    D-dimer, Quantitative    Troponin    CK-MB   2. Morbidly obese (CMS/HCC)  Stress Test With Myocardial Perfusion One Day   3. Palpitations  Stress Test With Myocardial Perfusion One Day    Adult Transthoracic Echo Complete W/ Cont if Necessary Per Protocol    Cardiac Event Monitor   4. Shortness of breath  Stress Test With Myocardial Perfusion One Day    Adult Transthoracic Echo Complete W/ Cont if Necessary Per Protocol    D-dimer, Quantitative    Troponin    CK-MB    BNP   5. Hyperlipidemia, unspecified hyperlipidemia type  Stress Test With Myocardial Perfusion One Day    atorvastatin (LIPITOR) 40 MG tablet    Comprehensive Metabolic Panel    Lipid Panel   6. Family history of early CAD  Stress Test With Myocardial Perfusion One Day   7. Lightheadedness  Cardiac Event Monitor   8. Peripheral edema  BNP   9. Healthcare maintenance  Comprehensive Metabolic Panel    Lipid Panel   10. Smoking         Return in about 12 weeks (around 7/7/2020).    Chest pain/obesity/palpitation/shortness of breath/hyperlipidemia/family history of early CAD-patient will have ischemia work-up, stress and echo.  Due to left shoulder injury she is unable to walk on a treadmill because she is not able to hold onto the armrest plus she also is too short of breath to walk on a treadmill.  Patient will start aspirin 81.  She will use nitro PRN for chest pain no resolution she will go to the ER.  I did receive her labs from her PCP so she will go ahead and start Lipitor.  She will get a repeat CMP and lipids in 6 weeks.  She will continue her medication regimen otherwise.  She  will follow-up in 12 weeks or sooner if any changes.  She will wear an event monitor for 2 weeks.  Due to having an episode of chest pain yesterday she will get a d-dimer, troponin, CK-MB and BNP today.    If blood pressure still slightly elevated at next follow-up we will start blood pressure medication.    Triglycerides were 584 and total cholesterol was 243.  LDL was unable to be calculated.  Her thyroid was within normal limits, her white blood cells were elevated at 13.3 and her creatinine was slightly elevated at 1.14.       [facility] Clothier  reports that she has been smoking cigarettes. She has a 8.50 pack-year smoking history. She has never used smokeless tobacco.. I have educated her on the risk of diseases from using tobacco products such as cancer, COPD and heart diease.        Patient's Body mass index is 40.78 kg/m². BMI is above normal parameters. Recommendations include: educational material and referral to primary care.      Electronically signed by:

## 2020-04-15 ENCOUNTER — TELEPHONE (OUTPATIENT)
Dept: CARDIOLOGY | Facility: CLINIC | Age: 50
End: 2020-04-15

## 2020-04-15 NOTE — TELEPHONE ENCOUNTER
"Called patient and notified her to take cholesterol medication as directed. Patient also reminded to have labs drawn in 6 weeks as well. Patient verbalized understanding and had no further questions at this time. -GHASSAN    Message   Received: Today   Message Contents   Isela Cutler APRN Hignite, Bridget R, MA             Her liver enzymes drawn by PCP were within normal limits so she is ok to start it.  Advise that she is to get lipids and CMP 6 weeks after starting so that we can make sure that the medication is not causing any issues with liver.      Previous Messages      ----- Message -----   From: Jeny Veronica MA   Sent: 4/15/2020  12:08 PM EDT   To: ABA Belcher     Pt stated cholesterol med was sent into pharmacy yesterday. She did pick this up and start taking it, recalled her PCP told her about a year ago to not take cholesterol medication because her \"fatty liver\" was too bad? Is she to proceed with taking this ? [facility]             Called patient and notified her of lab results. Notified of diet restrictions as well. Patient verbalized understanding and had no further questions at this time. -[facility]  "

## 2020-05-06 ENCOUNTER — APPOINTMENT (OUTPATIENT)
Dept: CARDIOLOGY | Facility: HOSPITAL | Age: 50
End: 2020-05-06

## 2020-05-28 ENCOUNTER — TELEPHONE (OUTPATIENT)
Dept: CARDIOLOGY | Facility: CLINIC | Age: 50
End: 2020-05-28

## 2020-05-28 NOTE — TELEPHONE ENCOUNTER
THE PM CALLED TO NOTIFY THE PT WHY THERE WAS A DELAY IN HER CARDIAC EVENT MONITOR, DUE TO AN ISSUE WITH HOW IT WAS PROCESSED IN THE WQ.

## 2020-06-12 ENCOUNTER — OUTSIDE FACILITY SERVICE (OUTPATIENT)
Dept: CARDIOLOGY | Facility: CLINIC | Age: 50
End: 2020-06-12
Payer: COMMERCIAL

## 2020-06-12 PROCEDURE — 93272 ECG/REVIEW INTERPRET ONLY: CPT | Performed by: INTERNAL MEDICINE

## 2020-09-08 ENCOUNTER — TELEPHONE (OUTPATIENT)
Dept: CARDIOLOGY | Facility: CLINIC | Age: 50
End: 2020-09-08

## 2020-09-08 NOTE — TELEPHONE ENCOUNTER
"Pt LVM stating she's \"having issues\" and is unsure if it's heart related.   #[phone]      Called pt, she stated that she called PCP and they're getting her in next week and think it's GERD. Stated she's had some pressure in the center of her chest and \"an enormous amount of belching.\" She denied needing anything further.   "

## 2020-09-25 ENCOUNTER — HOSPITAL ENCOUNTER (OUTPATIENT)
Dept: CARDIOLOGY | Facility: HOSPITAL | Age: 50
Discharge: HOME | End: 2020-09-25

## 2020-09-25 VITALS — BODY MASS INDEX: 40.75 KG/M2 | HEIGHT: 61 IN | WEIGHT: 215.83 LBS

## 2020-09-25 DIAGNOSIS — R00.2 PALPITATIONS: ICD-10-CM

## 2020-09-25 DIAGNOSIS — R07.89 OTHER CHEST PAIN: ICD-10-CM

## 2020-09-25 DIAGNOSIS — R06.02 SHORTNESS OF BREATH: ICD-10-CM

## 2020-09-25 DIAGNOSIS — E78.5 HYPERLIPIDEMIA, UNSPECIFIED HYPERLIPIDEMIA TYPE: ICD-10-CM

## 2020-09-25 DIAGNOSIS — E66.01 MORBIDLY OBESE (HCC): ICD-10-CM

## 2020-09-25 DIAGNOSIS — Z82.49 FAMILY HISTORY OF EARLY CAD: ICD-10-CM

## 2020-09-25 PROCEDURE — A9500 TC99M SESTAMIBI: HCPCS | Performed by: INTERNAL MEDICINE

## 2020-09-25 PROCEDURE — 93306 TTE W/DOPPLER COMPLETE: CPT | Performed by: INTERNAL MEDICINE

## 2020-09-25 PROCEDURE — 78452 HT MUSCLE IMAGE SPECT MULT: CPT | Performed by: INTERNAL MEDICINE

## 2020-09-25 PROCEDURE — 0 TECHNETIUM SESTAMIBI: Performed by: INTERNAL MEDICINE

## 2020-09-25 PROCEDURE — 78452 HT MUSCLE IMAGE SPECT MULT: CPT

## 2020-09-25 PROCEDURE — 93016 CV STRESS TEST SUPVJ ONLY: CPT | Performed by: NURSE PRACTITIONER

## 2020-09-25 PROCEDURE — 93306 TTE W/DOPPLER COMPLETE: CPT

## 2020-09-25 PROCEDURE — 93018 CV STRESS TEST I&R ONLY: CPT | Performed by: INTERNAL MEDICINE

## 2020-09-25 PROCEDURE — 93017 CV STRESS TEST TRACING ONLY: CPT

## 2020-09-25 PROCEDURE — 25010000002 REGADENOSON 0.4 MG/5ML SOLUTION: Performed by: INTERNAL MEDICINE

## 2020-09-25 RX ADMIN — TECHNETIUM TC 99M SESTAMIBI 1 DOSE: 1 INJECTION INTRAVENOUS at 11:00

## 2020-09-25 RX ADMIN — REGADENOSON 0.4 MG: 0.08 INJECTION, SOLUTION INTRAVENOUS at 11:00

## 2020-09-25 RX ADMIN — TECHNETIUM TC 99M SESTAMIBI 1 DOSE: 1 INJECTION INTRAVENOUS at 09:04

## 2020-09-27 LAB
BH CV NUCLEAR PRIOR STUDY: 2
BH CV STRESS COMMENTS STAGE 1: NORMAL
BH CV STRESS DOSE REGADENOSON STAGE 1: 0.4
BH CV STRESS DURATION MIN STAGE 1: 0
BH CV STRESS DURATION SEC STAGE 1: 10
BH CV STRESS PROTOCOL 1: NORMAL
BH CV STRESS RECOVERY BP: NORMAL MMHG
BH CV STRESS RECOVERY HR: 95 BPM
BH CV STRESS STAGE 1: 1
MAXIMAL PREDICTED HEART RATE: 172 BPM
PERCENT MAX PREDICTED HR: 55.81 %
STRESS BASELINE BP: NORMAL MMHG
STRESS BASELINE HR: 67 BPM
STRESS PERCENT HR: 66 %
STRESS POST PEAK BP: NORMAL MMHG
STRESS POST PEAK HR: 96 BPM
STRESS TARGET HR: 146 BPM

## 2020-09-29 ENCOUNTER — TELEPHONE (OUTPATIENT)
Dept: CARDIOLOGY | Facility: CLINIC | Age: 50
End: 2020-09-29

## 2020-09-29 LAB
AORTIC DIMENSIONLESS INDEX: 0.9 (DI)
BH CV ECHO MEAS - ACS: 1.6 CM
BH CV ECHO MEAS - AO MAX PG (FULL): 0.42 MMHG
BH CV ECHO MEAS - AO MAX PG: 4 MMHG
BH CV ECHO MEAS - AO MEAN PG (FULL): 0 MMHG
BH CV ECHO MEAS - AO MEAN PG: 2 MMHG
BH CV ECHO MEAS - AO ROOT AREA (BSA CORRECTED): 1.3
BH CV ECHO MEAS - AO ROOT AREA: 4.9 CM^2
BH CV ECHO MEAS - AO ROOT DIAM: 2.5 CM
BH CV ECHO MEAS - AO V2 MAX: 100 CM/SEC
BH CV ECHO MEAS - AO V2 MEAN: 60.7 CM/SEC
BH CV ECHO MEAS - AO V2 VTI: 22.1 CM
BH CV ECHO MEAS - BSA(HAYCOCK): 2.1 M^2
BH CV ECHO MEAS - BSA: 1.9 M^2
BH CV ECHO MEAS - BZI_BMI: 40.6 KILOGRAMS/M^2
BH CV ECHO MEAS - BZI_METRIC_HEIGHT: 154.9 CM
BH CV ECHO MEAS - BZI_METRIC_WEIGHT: 97.5 KG
BH CV ECHO MEAS - EDV(CUBED): 65.5 ML
BH CV ECHO MEAS - EDV(TEICH): 71.3 ML
BH CV ECHO MEAS - EF(CUBED): 69.3 %
BH CV ECHO MEAS - EF(TEICH): 61.4 %
BH CV ECHO MEAS - ESV(CUBED): 20.1 ML
BH CV ECHO MEAS - ESV(TEICH): 27.5 ML
BH CV ECHO MEAS - FS: 32.5 %
BH CV ECHO MEAS - IVS/LVPW: 1.4
BH CV ECHO MEAS - IVSD: 1.3 CM
BH CV ECHO MEAS - LA DIMENSION: 3.6 CM
BH CV ECHO MEAS - LA/AO: 1.4
BH CV ECHO MEAS - LAT PEAK E' VEL: 9.1 CM/SEC
BH CV ECHO MEAS - LV IVRT: 0.1 SEC
BH CV ECHO MEAS - LV MASS(C)D: 143.6 GRAMS
BH CV ECHO MEAS - LV MASS(C)DI: 73.7 GRAMS/M^2
BH CV ECHO MEAS - LV MAX PG: 3.6 MMHG
BH CV ECHO MEAS - LV MEAN PG: 2 MMHG
BH CV ECHO MEAS - LV V1 MAX: 94.6 CM/SEC
BH CV ECHO MEAS - LV V1 MEAN: 56.8 CM/SEC
BH CV ECHO MEAS - LV V1 VTI: 21.8 CM
BH CV ECHO MEAS - LVIDD: 4 CM
BH CV ECHO MEAS - LVIDS: 2.7 CM
BH CV ECHO MEAS - LVPWD: 0.91 CM
BH CV ECHO MEAS - MED PEAK E' VEL: 8.3 CM/SEC
BH CV ECHO MEAS - MV A MAX VEL: 68.3 CM/SEC
BH CV ECHO MEAS - MV DEC SLOPE: 416 CM/SEC^2
BH CV ECHO MEAS - MV DEC TIME: 0.24 SEC
BH CV ECHO MEAS - MV E MAX VEL: 79.3 CM/SEC
BH CV ECHO MEAS - MV E/A: 1.2
BH CV ECHO MEAS - MV MAX PG: 3 MMHG
BH CV ECHO MEAS - MV MEAN PG: 1 MMHG
BH CV ECHO MEAS - MV P1/2T MAX VEL: 97.6 CM/SEC
BH CV ECHO MEAS - MV P1/2T: 68.7 MSEC
BH CV ECHO MEAS - MV V2 MAX: 86.1 CM/SEC
BH CV ECHO MEAS - MV V2 MEAN: 50.2 CM/SEC
BH CV ECHO MEAS - MV V2 VTI: 22.5 CM
BH CV ECHO MEAS - MVA P1/2T LCG: 2.3 CM^2
BH CV ECHO MEAS - MVA(P1/2T): 3.2 CM^2
BH CV ECHO MEAS - PA MAX PG (FULL): 0.34 MMHG
BH CV ECHO MEAS - PA MAX PG: 3.3 MMHG
BH CV ECHO MEAS - PA MEAN PG (FULL): 1 MMHG
BH CV ECHO MEAS - PA MEAN PG: 2 MMHG
BH CV ECHO MEAS - PA V2 MAX: 90.4 CM/SEC
BH CV ECHO MEAS - PA V2 MEAN: 57 CM/SEC
BH CV ECHO MEAS - PA V2 VTI: 18.1 CM
BH CV ECHO MEAS - RAP SYSTOLE: 10 MMHG
BH CV ECHO MEAS - RV MAX PG: 2.9 MMHG
BH CV ECHO MEAS - RV MEAN PG: 1 MMHG
BH CV ECHO MEAS - RV V1 MAX: 85.5 CM/SEC
BH CV ECHO MEAS - RV V1 MEAN: 55.1 CM/SEC
BH CV ECHO MEAS - RV V1 VTI: 22.9 CM
BH CV ECHO MEAS - RVDD: 3.1 CM
BH CV ECHO MEAS - RVSP: 28 MMHG
BH CV ECHO MEAS - SI(AO): 55.7 ML/M^2
BH CV ECHO MEAS - SI(CUBED): 23.3 ML/M^2
BH CV ECHO MEAS - SI(TEICH): 22.5 ML/M^2
BH CV ECHO MEAS - SV(AO): 108.5 ML
BH CV ECHO MEAS - SV(CUBED): 45.3 ML
BH CV ECHO MEAS - SV(TEICH): 43.7 ML
BH CV ECHO MEAS - TR MAX VEL: 212 CM/SEC
BH CV ECHO MEASUREMENTS AVERAGE E/E' RATIO: 9.11
MAXIMAL PREDICTED HEART RATE: 172 BPM
STRESS TARGET HR: 146 BPM

## 2020-09-29 NOTE — TELEPHONE ENCOUNTER
"Patient aware of ECHO results. Coming in the morning at 9am to see Isela. MS,CMA<.    ---- Message from ABA Belcher sent at 9/29/2020 12:09 PM EDT -----    Please advise patient.    ECHO:   1.  LV size, function, and wall motion are normal.  Visually estimated ejection fraction is 60 to 65%.  Grade 1A diastolic dysfunction with mild left atrial enlargement.  Right heart chambers are normal.     2.  Valves are morphologically normal with no stenotic lesions or valve associated masses or thrombi.  There does appear to be increased echo \"brightness\" of the mitral annulus but no annular calcification is detected.  There is trivial to mild TR.     3.  No pericardial or great vessel pathology.     4.  Pulmonary artery systolic pressures are estimated in the high 20s.  "

## 2020-09-30 ENCOUNTER — OFFICE VISIT (OUTPATIENT)
Dept: CARDIOLOGY | Facility: CLINIC | Age: 50
End: 2020-09-30

## 2020-09-30 ENCOUNTER — TRANSCRIBE ORDERS (OUTPATIENT)
Dept: LAB | Facility: HOSPITAL | Age: 50
End: 2020-09-30

## 2020-09-30 ENCOUNTER — LAB (OUTPATIENT)
Dept: LAB | Facility: HOSPITAL | Age: 50
End: 2020-09-30

## 2020-09-30 VITALS
DIASTOLIC BLOOD PRESSURE: 83 MMHG | HEIGHT: 61 IN | TEMPERATURE: 98 F | HEART RATE: 97 BPM | BODY MASS INDEX: 40.97 KG/M2 | WEIGHT: 217 LBS | SYSTOLIC BLOOD PRESSURE: 125 MMHG | OXYGEN SATURATION: 96 %

## 2020-09-30 DIAGNOSIS — Z00.00 HEALTH MAINTENANCE EXAMINATION: ICD-10-CM

## 2020-09-30 DIAGNOSIS — Z00.00 HEALTH MAINTENANCE EXAMINATION: Primary | ICD-10-CM

## 2020-09-30 DIAGNOSIS — E78.5 HYPERLIPIDEMIA, UNSPECIFIED HYPERLIPIDEMIA TYPE: ICD-10-CM

## 2020-09-30 DIAGNOSIS — Z00.00 HEALTHCARE MAINTENANCE: ICD-10-CM

## 2020-09-30 DIAGNOSIS — R60.9 PERIPHERAL EDEMA: ICD-10-CM

## 2020-09-30 DIAGNOSIS — R00.2 PALPITATIONS: ICD-10-CM

## 2020-09-30 DIAGNOSIS — D72.829 LEUKOCYTOSIS, UNSPECIFIED TYPE: ICD-10-CM

## 2020-09-30 DIAGNOSIS — R07.89 OTHER CHEST PAIN: ICD-10-CM

## 2020-09-30 DIAGNOSIS — F17.200 SMOKING: ICD-10-CM

## 2020-09-30 DIAGNOSIS — R94.39 ABNORMAL STRESS TEST: ICD-10-CM

## 2020-09-30 DIAGNOSIS — R06.02 SHORTNESS OF BREATH: ICD-10-CM

## 2020-09-30 DIAGNOSIS — I51.89 GRADE I DIASTOLIC DYSFUNCTION: ICD-10-CM

## 2020-09-30 DIAGNOSIS — R94.39 ABNORMAL STRESS TEST: Primary | ICD-10-CM

## 2020-09-30 LAB
ALBUMIN SERPL-MCNC: 4.47 G/DL (ref 3.5–5.2)
ALBUMIN/GLOB SERPL: 1.7 G/DL
ALP SERPL-CCNC: 98 U/L (ref 39–117)
ALT SERPL W P-5'-P-CCNC: 17 U/L (ref 1–33)
ANION GAP SERPL CALCULATED.3IONS-SCNC: 13.1 MMOL/L (ref 5–15)
AST SERPL-CCNC: 16 U/L (ref 1–32)
BASOPHILS # BLD AUTO: 0.06 10*3/MM3 (ref 0–0.2)
BASOPHILS NFR BLD AUTO: 0.5 % (ref 0–1.5)
BILIRUB SERPL-MCNC: 0.2 MG/DL (ref 0–1.2)
BUN SERPL-MCNC: 17 MG/DL (ref 6–20)
BUN/CREAT SERPL: 18.3 (ref 7–25)
CALCIUM SPEC-SCNC: 9.8 MG/DL (ref 8.6–10.5)
CHLORIDE SERPL-SCNC: 101 MMOL/L (ref 98–107)
CO2 SERPL-SCNC: 25.9 MMOL/L (ref 22–29)
CREAT SERPL-MCNC: 0.93 MG/DL (ref 0.57–1)
DEPRECATED RDW RBC AUTO: 43.3 FL (ref 37–54)
DEPRECATED RDW RBC AUTO: 43.9 FL (ref 37–54)
EOSINOPHIL # BLD AUTO: 0.3 10*3/MM3 (ref 0–0.4)
EOSINOPHIL NFR BLD AUTO: 2.5 % (ref 0.3–6.2)
ERYTHROCYTE [DISTWIDTH] IN BLOOD BY AUTOMATED COUNT: 13.4 % (ref 12.3–15.4)
ERYTHROCYTE [DISTWIDTH] IN BLOOD BY AUTOMATED COUNT: 13.5 % (ref 12.3–15.4)
GFR SERPL CREATININE-BSD FRML MDRD: 64 ML/MIN/1.73
GLOBULIN UR ELPH-MCNC: 2.6 GM/DL
GLUCOSE SERPL-MCNC: 112 MG/DL (ref 65–99)
HCT VFR BLD AUTO: 46.9 % (ref 34–46.6)
HCT VFR BLD AUTO: 48.1 % (ref 34–46.6)
HGB BLD-MCNC: 15 G/DL (ref 12–15.9)
HGB BLD-MCNC: 15 G/DL (ref 12–15.9)
IMM GRANULOCYTES # BLD AUTO: 0.05 10*3/MM3 (ref 0–0.05)
IMM GRANULOCYTES NFR BLD AUTO: 0.4 % (ref 0–0.5)
LYMPHOCYTES # BLD AUTO: 2.91 10*3/MM3 (ref 0.7–3.1)
LYMPHOCYTES NFR BLD AUTO: 24.1 % (ref 19.6–45.3)
MCH RBC QN AUTO: 27.6 PG (ref 26.6–33)
MCH RBC QN AUTO: 28.2 PG (ref 26.6–33)
MCHC RBC AUTO-ENTMCNC: 31.2 G/DL (ref 31.5–35.7)
MCHC RBC AUTO-ENTMCNC: 32 G/DL (ref 31.5–35.7)
MCV RBC AUTO: 88.2 FL (ref 79–97)
MCV RBC AUTO: 88.6 FL (ref 79–97)
MONOCYTES # BLD AUTO: 0.73 10*3/MM3 (ref 0.1–0.9)
MONOCYTES NFR BLD AUTO: 6 % (ref 5–12)
NEUTROPHILS NFR BLD AUTO: 66.5 % (ref 42.7–76)
NEUTROPHILS NFR BLD AUTO: 8.04 10*3/MM3 (ref 1.7–7)
NRBC BLD AUTO-RTO: 0 /100 WBC (ref 0–0.2)
PLATELET # BLD AUTO: 275 10*3/MM3 (ref 140–450)
PLATELET # BLD AUTO: 276 10*3/MM3 (ref 140–450)
PMV BLD AUTO: 10.3 FL (ref 6–12)
PMV BLD AUTO: 10.4 FL (ref 6–12)
POTASSIUM SERPL-SCNC: 4.2 MMOL/L (ref 3.5–5.2)
PROT SERPL-MCNC: 7.1 G/DL (ref 6–8.5)
RBC # BLD AUTO: 5.32 10*6/MM3 (ref 3.77–5.28)
RBC # BLD AUTO: 5.43 10*6/MM3 (ref 3.77–5.28)
SODIUM SERPL-SCNC: 140 MMOL/L (ref 136–145)
WBC # BLD AUTO: 11.23 10*3/MM3 (ref 3.4–10.8)
WBC # BLD AUTO: 12.09 10*3/MM3 (ref 3.4–10.8)

## 2020-09-30 PROCEDURE — 36415 COLL VENOUS BLD VENIPUNCTURE: CPT

## 2020-09-30 PROCEDURE — 85027 COMPLETE CBC AUTOMATED: CPT | Performed by: NURSE PRACTITIONER

## 2020-09-30 PROCEDURE — 85025 COMPLETE CBC W/AUTO DIFF WBC: CPT | Performed by: FAMILY MEDICINE

## 2020-09-30 PROCEDURE — 99214 OFFICE O/P EST MOD 30 MIN: CPT | Performed by: NURSE PRACTITIONER

## 2020-09-30 PROCEDURE — 80053 COMPREHEN METABOLIC PANEL: CPT | Performed by: NURSE PRACTITIONER

## 2020-09-30 RX ORDER — CLOPIDOGREL BISULFATE 75 MG/1
75 TABLET ORAL DAILY
Qty: 30 TABLET | Refills: 11 | Status: SHIPPED | OUTPATIENT
Start: 2020-09-30 | End: 2020-11-13 | Stop reason: ALTCHOICE

## 2020-09-30 RX ORDER — PANTOPRAZOLE SODIUM 40 MG/1
40 TABLET, DELAYED RELEASE ORAL DAILY
COMMUNITY
End: 2022-07-22

## 2020-09-30 NOTE — PATIENT INSTRUCTIONS
Smoking Tobacco Information, Adult  Smoking tobacco can be harmful to your health. Tobacco contains a poisonous (toxic), colorless chemical called nicotine. Nicotine is addictive. It changes the brain and can make it hard to stop smoking. Tobacco also has other toxic chemicals that can hurt your body and raise your risk of many cancers.  How can smoking tobacco affect me?  Smoking tobacco puts you at risk for:  · Cancer. Smoking is most commonly associated with lung cancer, but can also lead to cancer in other parts of the body.  · Chronic obstructive pulmonary disease (COPD). This is a long-term lung condition that makes it hard to breathe. It also gets worse over time.  · High blood pressure (hypertension), heart disease, stroke, or heart attack.  · Lung infections, such as pneumonia.  · Cataracts. This is when the lenses in the eyes become clouded.  · Digestive problems. This may include peptic ulcers, heartburn, and gastroesophageal reflux disease (GERD).  · Oral health problems, such as gum disease and tooth loss.  · Loss of taste and smell.  Smoking can affect your appearance by causing:  · Wrinkles.  · Yellow or stained teeth, fingers, and fingernails.  Smoking tobacco can also affect your social life, because:  · It may be challenging to find places to smoke when away from home. Many workplaces, restaurants, hotels, and public places are tobacco-free.  · Smoking is expensive. This is due to the cost of tobacco and the long-term costs of treating health problems from smoking.  · Secondhand smoke may affect those around you. Secondhand smoke can cause lung cancer, breathing problems, and heart disease. Children of smokers have a higher risk for:  ? Sudden infant death syndrome (SIDS).  ? Ear infections.  ? Lung infections.  If you currently smoke tobacco, quitting now can help you:  · Lead a longer and healthier life.  · Look, smell, breathe, and feel better over time.  · Save money.  · Protect others from the  harms of secondhand smoke.  What actions can I take to prevent health problems?  Quit smoking    · Do not start smoking. Quit if you already do.  · Make a plan to quit smoking and commit to it. Look for programs to help you and ask your health care provider for recommendations and ideas.  · Set a date and write down all the reasons you want to quit.  · Let your friends and family know you are quitting so they can help and support you. Consider finding friends who also want to quit. It can be easier to quit with someone else, so that you can support each other.  · Talk with your health care provider about using nicotine replacement medicines to help you quit, such as gum, lozenges, patches, sprays, or pills.  · Do not replace cigarette smoking with electronic cigarettes, which are commonly called e-cigarettes. The safety of e-cigarettes is not known, and some may contain harmful chemicals.  · If you try to quit but return to smoking, stay positive. It is common to slip up when you first quit, so take it one day at a time.  · Be prepared for cravings. When you feel the urge to smoke, chew gum or suck on hard candy.  Lifestyle  · Stay busy and take care of your body.  · Drink enough fluid to keep your urine pale yellow.  · Get plenty of exercise and eat a healthy diet. This can help prevent weight gain after quitting.  · Monitor your eating habits. Quitting smoking can cause you to have a larger appetite than when you smoke.  · Find ways to relax. Go out with friends or family to a movie or a restaurant where people do not smoke.  · Ask your health care provider about having regular tests (screenings) to check for cancer. This may include blood tests, imaging tests, and other tests.  · Find ways to manage your stress, such as meditation, yoga, or exercise.  Where to find support  To get support to quit smoking, consider:  · Asking your health care provider for more information and resources.  · Taking classes to learn  more about quitting smoking.  · Looking for local organizations that offer resources about quitting smoking.  · Joining a support group for people who want to quit smoking in your local community.  · Calling the smokefree.gov counselor helpline: 1-800-Quit-Now (1-207.668.5018)  Where to find more information  You may find more information about quitting smoking from:  · HelpGuide.org: www.helpguide.org  · Smokefree.gov: smokefree.gov  · American Lung Association: www.lung.org  Contact a health care provider if you:  · Have problems breathing.  · Notice that your lips, nose, or fingers turn blue.  · Have chest pain.  · Are coughing up blood.  · Feel faint or you pass out.  · Have other health changes that cause you to worry.  Summary  · Smoking tobacco can negatively affect your health, the health of those around you, your finances, and your social life.  · Do not start smoking. Quit if you already do. If you need help quitting, ask your health care provider.  · Think about joining a support group for people who want to quit smoking in your local community. There are many effective programs that will help you to quit this behavior.  This information is not intended to replace advice given to you by your health care provider. Make sure you discuss any questions you have with your health care provider.  Document Released: 01/02/2018 Document Revised: 02/06/2019 Document Reviewed: 01/02/2018  [employer] Patient Education © 2020 [employer]  Fat and Cholesterol Restricted Eating Plan  Getting too much fat and cholesterol in your diet may cause health problems. Choosing the right foods helps keep your fat and cholesterol at normal levels. This can keep you from getting certain diseases.  Your doctor may recommend an eating plan that includes:  · Total fat: ______% or less of total calories a day.  · Saturated fat: ______% or less of total calories a day.  · Cholesterol: less than _________mg a day.  · Fiber: ______g a  "day.  What are tips for following this plan?  Meal planning  · At meals, divide your plate into four equal parts:  ? Fill one-half of your plate with vegetables and green salads.  ? Fill one-fourth of your plate with whole grains.  ? Fill one-fourth of your plate with low-fat (lean) protein foods.  · Eat fish that is high in omega-3 fats at least two times a week. This includes mackerel, tuna, sardines, and salmon.  · Eat foods that are high in fiber, such as whole grains, beans, apples, broccoli, carrots, peas, and barley.  General tips    · Work with your doctor to lose weight if you need to.  · Avoid:  ? Foods with added sugar.  ? Fried foods.  ? Foods with partially hydrogenated oils.  · Limit alcohol intake to no more than 1 drink a day for nonpregnant women and 2 drinks a day for men. One drink equals 12 oz of beer, 5 oz of wine, or 1½ oz of hard liquor.  Reading food labels  · Check food labels for:  ? Trans fats.  ? Partially hydrogenated oils.  ? Saturated fat (g) in each serving.  ? Cholesterol (mg) in each serving.  ? Fiber (g) in each serving.  · Choose foods with healthy fats, such as:  ? Monounsaturated fats.  ? Polyunsaturated fats.  ? Omega-3 fats.  · Choose grain products that have whole grains. Look for the word \"whole\" as the first word in the ingredient list.  Cooking  · Cook foods using low-fat methods. These include baking, boiling, grilling, and broiling.  · Eat more home-cooked foods. Eat at restaurants and buffets less often.  · Avoid cooking using saturated fats, such as butter, cream, palm oil, palm kernel oil, and coconut oil.  Recommended foods    Fruits  · All fresh, canned (in natural juice), or frozen fruits.  Vegetables  · Fresh or frozen vegetables (raw, steamed, roasted, or grilled). Green salads.  Grains  · Whole grains, such as whole wheat or whole grain breads, crackers, cereals, and pasta. Unsweetened oatmeal, bulgur, barley, quinoa, or brown rice. Corn or whole wheat flour " tortillas.  Meats and other protein foods  · Ground beef (85% or leaner), grass-fed beef, or beef trimmed of fat. Skinless chicken or turkey. Ground chicken or turkey. Pork trimmed of fat. All fish and seafood. Egg whites. Dried beans, peas, or lentils. Unsalted nuts or seeds. Unsalted canned beans. Nut butters without added sugar or oil.  Dairy  · Low-fat or nonfat dairy products, such as skim or 1% milk, 2% or reduced-fat cheeses, low-fat and fat-free ricotta or cottage cheese, or plain low-fat and nonfat yogurt.  Fats and oils  · Tub margarine without trans fats. Light or reduced-fat mayonnaise and salad dressings. Avocado. Olive, canola, sesame, or safflower oils.  The items listed above may not be a complete list of foods and beverages you can eat. Contact a dietitian for more information.  Foods to avoid  Fruits  · Canned fruit in heavy syrup. Fruit in cream or butter sauce. Fried fruit.  Vegetables  · Vegetables cooked in cheese, cream, or butter sauce. Fried vegetables.  Grains  · White bread. White pasta. White rice. Cornbread. Bagels, pastries, and croissants. Crackers and snack foods that contain trans fat and hydrogenated oils.  Meats and other protein foods  · Fatty cuts of meat. Ribs, chicken wings, khan, sausage, bologna, salami, chitterlings, fatback, hot dogs, bratwurst, and packaged lunch meats. Liver and organ meats. Whole eggs and egg yolks. Chicken and turkey with skin. Fried meat.  Dairy  · Whole or 2% milk, cream, half-and-half, and cream cheese. Whole milk cheeses. Whole-fat or sweetened yogurt. Full-fat cheeses. Nondairy creamers and whipped toppings. Processed cheese, cheese spreads, and cheese curds.  Beverages  · Alcohol. Sugar-sweetened drinks such as sodas, lemonade, and fruit drinks.  Fats and oils  · Butter, stick margarine, lard, shortening, ghee, or khan fat. Coconut, palm kernel, and palm oils.  Sweets and desserts  · Corn syrup, sugars, honey, and molasses. Candy. Jam and jelly.  Syrup. Sweetened cereals. Cookies, pies, cakes, donuts, muffins, and ice cream.  The items listed above may not be a complete list of foods and beverages you should avoid. Contact a dietitian for more information.  Summary  · Choosing the right foods helps keep your fat and cholesterol at normal levels. This can keep you from getting certain diseases.  · At meals, fill one-half of your plate with vegetables and green salads.  · Eat high-fiber foods, like whole grains, beans, apples, carrots, peas, and barley.  · Limit added sugar, saturated fats, alcohol, and fried foods.  This information is not intended to replace advice given to you by your health care provider. Make sure you discuss any questions you have with your health care provider.  Document Released: 06/18/2013 Document Revised: 08/21/2019 Document Reviewed: 09/04/2018  [employer] Patient Education © 2020 [employer]  BMI for Adults    Body mass index (BMI) is a number that is calculated from a person's weight and height. BMI may help to estimate how much of a person's weight is composed of fat. BMI can help identify those who may be at higher risk for certain medical problems.  How is BMI used with adults?  BMI is used as a screening tool to identify possible weight problems. It is used to check whether a person is obese, overweight, healthy weight, or underweight.  How is BMI calculated?  BMI measures your weight and compares it to your height. This can be done either in English ([place] or metric measurements. Note that charts are available to help you find your BMI quickly and easily without having to do these calculations yourself.  To calculate your BMI in English ([place] measurements, your health care provider will:  1. Measure your weight in pounds (lb).  2. Multiply the number of pounds by 703.  ? For example, for a person who weighs 180 lb, multiply that number by 703, which equals 126,540.  3. Measure your height in inches (in). Then multiply that  "number by itself to get a measurement called \"inches squared.\"  ? For example, for a person who is 70 in tall, the \"inches squared\" measurement is 70 in x 70 in, which equals 4900 inches squared.  4. Divide the total from Step 2 (number of lb x 703) by the total from Step 3 (inches squared): 126,540 ÷ 4900 = 25.8. This is your BMI.  To calculate your BMI in metric measurements, your health care provider will:  1. Measure your weight in kilograms (kg).  2. Measure your height in meters (m). Then multiply that number by itself to get a measurement called \"meters squared.\"  ? For example, for a person who is 1.75 m tall, the \"meters squared\" measurement is 1.75 m x 1.75 m, which is equal to 3.1 meters squared.  3. Divide the number of kilograms (your weight) by the meters squared number. In this example: 70 ÷ 3.1 = 22.6. This is your BMI.  How is BMI interpreted?  To interpret your results, your health care provider will use BMI charts to identify whether you are underweight, normal weight, overweight, or obese. The following guidelines will be used:  · Underweight: BMI less than 18.5.  · Normal weight: BMI between 18.5 and 24.9.  · Overweight: BMI between 25 and 29.9.  · Obese: BMI of 30 and above.  Please note:  · Weight includes both fat and muscle, so someone with a muscular build, such as an athlete, may have a BMI that is higher than 24.9. In cases like these, BMI is not an accurate measure of body fat.  · To determine if excess body fat is the cause of a BMI of 25 or higher, further assessments may need to be done by a health care provider.  · BMI is usually interpreted in the same way for men and women.  Why is BMI a useful tool?  BMI is useful in two ways:  · Identifying a weight problem that may be related to a medical condition, or that may increase the risk for medical problems.  · Promoting lifestyle and diet changes in order to reach a healthy weight.  Summary  · Body mass index (BMI) is a number that is " calculated from a person's weight and height.  · BMI may help to estimate how much of a person's weight is composed of fat. BMI can help identify those who may be at higher risk for certain medical problems.  · BMI can be measured using English measurements or metric measurements.  · To interpret your results, your health care provider will use BMI charts to identify whether you are underweight, normal weight, overweight, or obese.  This information is not intended to replace advice given to you by your health care provider. Make sure you discuss any questions you have with your health care provider.  Document Released: 08/29/2005 Document Revised: 11/30/2018 Document Reviewed: 10/31/2018  [employer] Patient Education © 2020 [employer]    Coronary Angiogram With Stent  Coronary angiogram with stent placement is a procedure to widen or open a narrow blood vessel of the heart (coronary artery). Arteries may become blocked by cholesterol buildup (plaques) in the lining of the wall. When a coronary artery becomes partially blocked, blood flow to that area decreases. This may lead to chest pain or a heart attack (myocardial infarction).  A stent is a small piece of metal that looks like mesh or a spring. Stent placement may be done as treatment for a heart attack or right after a coronary angiogram in which a blocked artery is found.  Let your health care provider know about:  · Any allergies you have.  · All medicines you are taking, including vitamins, herbs, eye drops, creams, and over-the-counter medicines.  · Any problems you or family members have had with anesthetic medicines.  · Any blood disorders you have.  · Any surgeries you have had.  · Any medical conditions you have.  · Whether you are pregnant or may be pregnant.  What are the risks?  Generally, this is a safe procedure. However, problems may occur, including:  · Damage to the heart or its blood vessels.  · A return of blockage.  · Bleeding, infection,  or bruising at the insertion site.  · A collection of blood under the skin (hematoma) at the insertion site.  · A blood clot in another part of the body.  · Kidney injury.  · Allergic reaction to the dye or contrast that is used.  · Bleeding into the abdomen (retroperitoneal bleeding).  What happens before the procedure?  Staying hydrated  Follow instructions from your health care provider about hydration, which may include:  · Up to 2 hours before the procedure - you may continue to drink clear liquids, such as water, clear fruit juice, black coffee, and plain tea.    Eating and drinking restrictions  Follow instructions from your health care provider about eating and drinking, which may include:  · 8 hours before the procedure - stop eating heavy meals or foods such as meat, fried foods, or fatty foods.  · 6 hours before the procedure - stop eating light meals or foods, such as toast or cereal.  · 2 hours before the procedure - stop drinking clear liquids.  Ask your health care provider about:  · Changing or stopping your regular medicines. This is especially important if you are taking diabetes medicines or blood thinners.  · Taking medicines such as ibuprofen. These medicines can thin your blood. Do not take these medicines before your procedure if your health care provider instructs you not to. Generally, aspirin is recommended before a procedure of passing a small, thin tube (catheter) through a blood vessel and into the heart (cardiac catheterization).  What happens during the procedure?    · An IV tube will be inserted into one of your veins.  · You will be given one or more of the following:  ? A medicine to help you relax (sedative).  ? A medicine to numb the area where the catheter will be inserted into an artery (local anesthetic).  · To reduce your risk of infection:  ? Your health care team will wash or sanitize their hands.  ? Your skin will be washed with soap.  ? Hair may be removed from the area  where the catheter will be inserted.  · Using a guide wire, the catheter will be inserted into an artery. The location may be in your groin, in your wrist, or in the fold of your arm (near your elbow).  · A type of X-ray (fluoroscopy) will be used to help guide the catheter to the opening of the arteries in the heart.  · A dye will be injected into the catheter, and X-rays will be taken. The dye will help to show where any narrowing or blockages are located in the arteries.  · A tiny wire will be guided to the blocked spot, and a balloon will be inflated to make the artery wider.  · The stent will be expanded and will crush the plaques into the wall of the vessel. The stent will hold the area open and improve the blood flow. Most stents have a drug coating to reduce the risk of the stent narrowing over time.  · The artery may be made wider using a drill, laser, or other tools to remove plaques.  · When the blood flow is better, the catheter will be removed. The lining of the artery will grow over the stent, which stays where it was placed.  This procedure may vary among health care providers and hospitals.  What happens after the procedure?  · If the procedure is done through the leg, you will be kept in bed lying flat for about 6 hours. You will be instructed to not bend and not cross your legs.  · The insertion site will be checked frequently.  · The pulse in your foot or wrist will be checked frequently.  · You may have additional blood tests, X-rays, and a test that records the electrical activity of your heart (electrocardiogram, or ECG).  This information is not intended to replace advice given to you by your health care provider. Make sure you discuss any questions you have with your health care provider.  Document Released: 06/23/2004 Document Revised: 03/29/2019 Document Reviewed: 07/23/2017  [employer] Patient Education © 2020 [employer]

## 2020-09-30 NOTE — PROGRESS NOTES
Brynn Marquez Clothier is a 50 y.o. female     Chief Complaint   Patient presents with   • Follow-up     testing follow up       HPI    Problem list:    1.  Chest pain  1.1 stress test 9/25/2020-very mild lateral and posterior lateral wall ischemia, post-rest EF 73%  2.  Hyperlipidemia  3.  Palpitations  3.1 event monitor 6/11-6/12/2020-sinus tach  4.  Family history of early coronary artery disease  5.  Shortness of breath  5.1 echo 9/25/2020-EF 60 to 65%, diastolic dysfunction 1, trivial to mild TR, PA in the 20s  6.  Borderline diabetes mellitus type 2  7.  Peripheral edema  8.  Smoking habituation    Patient is a 50-year-old female who presents today for follow-up on testing.  She continues to have what she describes as midsternal chest pressure that is also just to the left of the sternum.  She says it will radiate into her neck.  Whenever it happens she will become diaphoretic and nauseated.  She says that it happens pretty much anytime she has activity.  She says she will have to stop and rest and it will resolve.  She says she has not used nitroglycerin.  Patient says that she has fluttering that will take her breath for a quick and then is gone.  She says it does not linger.  She denies any dizziness, presyncope, syncope, orthopnea or PND.  She gives just a little bit of swelling bilaterally.  She says she does get short of breath with activity.  She says just walking from our parking lot to our lobby made her very short of breath and she needs to stop and rest.  She says she stays absolutely fatigued.  Patient says she try to take blood pressure medication and they had to discontinue it because it dropped her blood pressure too low.  Patient's previous cholesterol her triglycerides were 584 so we are unsure of her LDL but she is having it rechecked today.    We went over stress, echo and event.    Current Outpatient Medications on File Prior to Visit   Medication Sig Dispense Refill   • aspirin  81 MG tablet Take 1 tablet by mouth Daily. 30 tablet 11   • atorvastatin (LIPITOR) 40 MG tablet Take 1 tablet by mouth Daily. 90 tablet 3   • ibuprofen (ADVIL,MOTRIN) 800 MG tablet Take 800 mg by mouth 2 (Two) Times a Day As Needed.     • nitroglycerin (NITROSTAT) 0.4 MG SL tablet 1 under the tongue as needed for angina, may repeat q5mins for up three doses 30 tablet 5   • pantoprazole (PROTONIX) 40 MG EC tablet Take 40 mg by mouth Daily.       No current facility-administered medications on file prior to visit.        ALLERGIES    Patient has no known allergies.    Past Medical History:   Diagnosis Date   • Arthritis    • Asthma    • B12 deficiency    • Back pain    • Bronchitis    • Chronic kidney disease     kidney stones   • Depression    • Diverticulosis    • Fatty liver    • Generalized headaches    • GERD (gastroesophageal reflux disease)    • High cholesterol    • Hyperlipidemia    • Pancreatitis    • Pneumonia    • Wears dentures     UPPER       Social History     Socioeconomic History   • Marital status:      Spouse name: Not on file   • Number of children: Not on file   • Years of education: Not on file   • Highest education level: Not on file   Tobacco Use   • Smoking status: Current Every Day Smoker     Packs/day: 0.50     Years: 17.00     Pack years: 8.50     Types: Cigarettes   • Smokeless tobacco: Never Used   Substance and Sexual Activity   • Alcohol use: Not Currently     Comment: social   • Drug use: Never   • Sexual activity: Defer       Family History   Problem Relation Age of Onset   • Liver disease Father    • Kidney cancer Father    • Heart attack Father    • Hypertension Father    • Hyperlipidemia Father    • Colon cancer Maternal Grandfather    • Diabetes Mother        Review of Systems   Constitutional: Positive for diaphoresis (with CP) and fatigue (stays fatigued; ). Negative for chills and fever.   HENT: Negative for congestion, rhinorrhea and sore throat.    Eyes: Negative for  "visual disturbance.   Respiratory: Positive for shortness of breath (moving around makes worse; constantly feels short of breath; just walking from our parking lot to our lobby has to stop and get her breath). Negative for cough, chest tightness and wheezing.    Cardiovascular: Positive for chest pain (pressure midsternum to the left of sternum; rad to left neck though she was suppose to use nitro for sharp pain only; notices more with activity and has it pretty often ), palpitations (flutters; real quick takes breath and then gone, does not linger ) and leg swelling (bilateral- small).   Gastrointestinal: Positive for nausea (with CP ). Negative for abdominal pain, blood in stool and vomiting.        Very random burping episodes, this will be over and over and she has not been like this before    Endocrine: Positive for heat intolerance (hot most of the time). Negative for cold intolerance.   Genitourinary: Positive for frequency (day and night). Negative for dysuria, hematuria and urgency.   Musculoskeletal: Positive for arthralgias (joints). Negative for back pain.   Skin: Negative for rash and wound.   Allergic/Immunologic: Positive for environmental allergies (seasonal ). Negative for food allergies.   Neurological: Negative for dizziness, weakness and light-headedness.   Hematological: Bruises/bleeds easily (bleeding).   Psychiatric/Behavioral: Negative for sleep disturbance (denies waking with smothering at night).       Objective   /83 (BP Location: Right arm, Patient Position: Sitting)   Pulse 97   Temp 98 °F (36.7 °C)   Ht 154.9 cm (61\")   Wt 98.4 kg (217 lb)   SpO2 96%   BMI 41.00 kg/m²   Vitals:    09/30/20 0851   BP: 125/83   BP Location: Right arm   Patient Position: Sitting   Pulse: 97   Temp: 98 °F (36.7 °C)   SpO2: 96%   Weight: 98.4 kg (217 lb)   Height: 154.9 cm (61\")      Lab Results (most recent)     None        Physical Exam  Vitals signs reviewed.   Constitutional:       General: " She is awake.      Appearance: Normal appearance. She is well-developed and well-groomed. She is obese.   HENT:      Head: Normocephalic.   Eyes:      General: Lids are normal.   Neck:      Vascular: No carotid bruit, hepatojugular reflux or JVD.   Cardiovascular:      Rate and Rhythm: Normal rate and regular rhythm.      Pulses:           Radial pulses are 2+ on the right side and 2+ on the left side.        Dorsalis pedis pulses are 2+ on the right side and 2+ on the left side.        Posterior tibial pulses are 2+ on the right side and 2+ on the left side.      Heart sounds: Normal heart sounds.   Pulmonary:      Effort: Pulmonary effort is normal.      Breath sounds: Normal air entry. Examination of the right-lower field reveals decreased breath sounds. Examination of the left-lower field reveals decreased breath sounds. Decreased breath sounds present.   Abdominal:      General: Bowel sounds are normal.      Palpations: Abdomen is soft.   Musculoskeletal:      Right lower leg: No edema.      Left lower leg: No edema.   Skin:     General: Skin is warm and dry.   Neurological:      Mental Status: She is alert and oriented to person, place, and time.   Psychiatric:         Attention and Perception: Attention and perception normal.         Mood and Affect: Mood and affect normal.         Speech: Speech normal.         Behavior: Behavior normal. Behavior is cooperative.         Thought Content: Thought content normal.         Cognition and Memory: Cognition and memory normal.         Judgment: Judgment normal.         Procedure   Procedures         Assessment/Plan      Diagnosis Plan   1. Abnormal stress test  [facility]    clopidogrel (PLAVIX) 75 MG tablet    Comprehensive Metabolic Panel    CBC (No Diff)   2. Palpitations  [facility]   3. Hyperlipidemia, unspecified hyperlipidemia type  [facility]   4. Shortness of breath  [facility]   5. Smoking     6. Peripheral edema      7. Other chest pain  [facility]    clopidogrel (PLAVIX) 75 MG tablet   8. Grade I diastolic dysfunction     9. Healthcare maintenance  Comprehensive Metabolic Panel    CBC (No Diff)       Return 2-4 weeks after LHC.  Abnormal stress test/chest pain/palpitation/hyperlipidemia/shortness of breath/smoking/family history of early CAD-patient wishes to proceed with left heart cath.  She will start Plavix.  She will use nitro PRN for chest pain no resolution she will go to the ER.  She will continue her medication regimen otherwise.  She will follow-up 2 to 4 weeks after left heart catheter sooner if any changes.         Kandy Noble  reports that she has been smoking cigarettes. She has a 8.50 pack-year smoking history. She has never used smokeless tobacco.. I have educated her on the risk of diseases from using tobacco products such as cancer, COPD and heart diease.     I advised her to quit and she is not willing to quit.    I spent 3  minutes counseling the patient.         Patient's Body mass index is 41 kg/m². BMI is above normal parameters. Recommendations include: educational material and referral to primary care.      Electronically signed by:

## 2020-10-01 ENCOUNTER — TELEPHONE (OUTPATIENT)
Dept: CARDIOLOGY | Facility: CLINIC | Age: 50
End: 2020-10-01

## 2020-10-01 NOTE — TELEPHONE ENCOUNTER
Patient aware. MS,CMA.     ----- Message from ABA Belcher sent at 10/1/2020  6:40 AM EDT -----    For [facility].  Her WBC have been elevated for some time as well.  Her PCP should be receiving copies of the labs so please make sure she follows up with them.

## 2020-10-20 ENCOUNTER — TELEPHONE (OUTPATIENT)
Dept: CARDIOLOGY | Facility: CLINIC | Age: 50
End: 2020-10-20

## 2020-10-20 NOTE — TELEPHONE ENCOUNTER
[facility] approved during Peer to peer with Dr. Beasley [id number].  It will be faxed over.  Dima made aware.

## 2020-10-21 ENCOUNTER — OUTSIDE FACILITY SERVICE (OUTPATIENT)
Dept: CARDIOLOGY | Facility: CLINIC | Age: 50
End: 2020-10-21

## 2020-10-21 PROCEDURE — 93458 L HRT ARTERY/VENTRICLE ANGIO: CPT | Performed by: INTERNAL MEDICINE

## 2020-11-13 ENCOUNTER — OFFICE VISIT (OUTPATIENT)
Dept: CARDIOLOGY | Facility: CLINIC | Age: 50
End: 2020-11-13

## 2020-11-13 VITALS
SYSTOLIC BLOOD PRESSURE: 139 MMHG | WEIGHT: 221 LBS | DIASTOLIC BLOOD PRESSURE: 96 MMHG | HEIGHT: 61 IN | OXYGEN SATURATION: 97 % | HEART RATE: 98 BPM | BODY MASS INDEX: 41.72 KG/M2

## 2020-11-13 DIAGNOSIS — E78.5 HYPERLIPIDEMIA, UNSPECIFIED HYPERLIPIDEMIA TYPE: ICD-10-CM

## 2020-11-13 DIAGNOSIS — I25.10 CORONARY ARTERY DISEASE INVOLVING NATIVE CORONARY ARTERY OF NATIVE HEART WITHOUT ANGINA PECTORIS: Primary | ICD-10-CM

## 2020-11-13 DIAGNOSIS — R60.9 PERIPHERAL EDEMA: ICD-10-CM

## 2020-11-13 DIAGNOSIS — R07.89 CHEST TIGHTNESS: ICD-10-CM

## 2020-11-13 DIAGNOSIS — R06.02 SHORTNESS OF BREATH: ICD-10-CM

## 2020-11-13 DIAGNOSIS — F17.200 SMOKING: ICD-10-CM

## 2020-11-13 DIAGNOSIS — R00.2 PALPITATIONS: ICD-10-CM

## 2020-11-13 DIAGNOSIS — I51.89 GRADE I DIASTOLIC DYSFUNCTION: ICD-10-CM

## 2020-11-13 PROCEDURE — 99214 OFFICE O/P EST MOD 30 MIN: CPT | Performed by: NURSE PRACTITIONER

## 2020-11-13 RX ORDER — FUROSEMIDE 20 MG/1
20 TABLET ORAL DAILY PRN
Qty: 30 TABLET | Refills: 11 | Status: SHIPPED | OUTPATIENT
Start: 2020-11-13 | End: 2022-07-22

## 2020-11-13 NOTE — PATIENT INSTRUCTIONS
Coronary Artery Disease, Female  Coronary artery disease (CAD) is a condition in which the arteries that lead to the heart (coronary arteries) become narrow or blocked. The narrowing or blockage can lead to decreased blood flow to the heart. Prolonged reduced blood flow can cause a heart attack (myocardial infarction or MI). This condition may also be called coronary heart disease.  Because CAD is the leading cause of death in women, it is important to understand what causes this condition and how it is treated.  What are the causes?  CAD is most often caused by atherosclerosis. This is the buildup of fat and cholesterol (plaque) on the inside of the arteries. Over time, the plaque may narrow or block the artery, reducing blood flow to the heart. Plaque can also become weak and break off within a coronary artery and cause a sudden blockage. Other less common causes of CAD include:  · A blood clot or a piece of a blood clot or other substance that blocks the flow of blood in a coronary artery (embolism).  · A tearing of the artery (spontaneous coronary artery dissection).  · An enlargement of an artery (aneurysm).  · Inflammation (vasculitis) in the artery wall.  What increases the risk?  The following factors may make you more likely to develop this condition:  · Age. Women over age 55 are at a greater risk of CAD.  · Family history of CAD.  · High blood pressure (hypertension).  · Diabetes.  · High cholesterol levels.  · Tobacco use.  · Lack of exercise.  · Menopause.  ? All postmenopausal women are at greater risk of CAD.  ? Women who have experienced menopause between the ages of 40-45 (early menopause) are at a higher risk of CAD.  ? Women who have experienced menopause before age 40 (premature menopause) are at a very high risk of CAD.  · Excessive alcohol use.  · A diet high in saturated and trans fats, such as fried food and processed meat.  Other possible risk factors include:  · High stress  levels.  · Depression.  · Obesity.  · Sleep apnea.  What are the signs or symptoms?  Many people do not have any symptoms during the early stages of CAD. As the condition progresses, symptoms may include:  · Chest pain (angina). The pain can:  ? Feel like crushing or squeezing, or like a tightness, pressure, fullness, or heaviness in the chest.  ? Last more than a few minutes or can stop and recur. The pain tends to get worse with exercise or stress and to fade with rest.  · Pain in the arms, neck, jaw, ear, or back.  · Unexplained heartburn or indigestion.  · Shortness of breath.  · Nausea.  · Sudden cold sweats.  · Sudden light-headedness.  · Fluttering or fast heartbeat (palpitations).  Many women have chest discomfort and the other symptoms. However, women often have unusual (atypical) symptoms, such as:  · Fatigue.  · Vomiting.  · Unexplained feelings of nervousness or anxiety.  · Unexplained weakness.  · Dizziness or fainting.  How is this diagnosed?  This condition is diagnosed based on:  · Your family and medical history.  · A physical exam.  · Tests, including:  ? A test to check the electrical signals in your heart (electrocardiogram).  ? Exercise stress test. This looks for signs of blockage when the heart is stressed with exercise, such as running on a treadmill.  ? Pharmacologic stress test. This test looks for signs of blockage when the heart is being stressed with a medicine.  ? Blood tests.  ? Coronary angiogram. This is a procedure to look at the coronary arteries to see if there is any blockage. During this test, a dye is injected into your arteries so they appear on an X-ray.  ? Coronary artery CT scan. This CT scan helps detect calcium deposits in your coronary arteries. Calcium deposits are an indicator of CAD.  ? A test that uses sound waves to take a picture of your heart (echocardiogram).  ? Chest X-ray.  How is this treated?  This condition may be treated by:  · Healthy lifestyle changes to  reduce risk factors.  · Medicines such as:  ? Antiplatelet medicines and blood-thinning medicines, such as aspirin. These help to prevent blood clots.  ? Nitroglycerin.  ? Blood pressure medicines.  ? Cholesterol-lowering medicine.  · Coronary angioplasty and stenting. During this procedure, a thin, flexible tube is inserted through a blood vessel and into a blocked artery. A balloon or similar device on the end of the tube is inflated to open up the artery. In some cases, a small, mesh tube (stent) is inserted into the artery to keep it open.  · Coronary artery bypass surgery. During this surgery, veins or arteries from other parts of the body are used to create a bypass around the blockage and allow blood to reach your heart.  Follow these instructions at home:  Medicines  · Take over-the-counter and prescription medicines only as told by your health care provider.  · Do not take the following medicines unless your health care provider approves:  ? NSAIDs, such as ibuprofen, naproxen, or celecoxib.  ? Vitamin supplements that contain vitamin A, vitamin E, or both.  ? Hormone replacement therapy that contains estrogen with or without progestin.  Lifestyle  · Follow an exercise program approved by your health care provider. Aim for 150 minutes of moderate exercise or 75 minutes of vigorous exercise each week.  · Maintain a healthy weight or lose weight as approved by your health care provider.  · Learn to manage stress or try to limit your stress. Ask your health care provider for suggestions if you need help.  · Get screened for depression and seek treatment, if needed.  · Do not use any products that contain nicotine or tobacco, such as cigarettes, e-cigarettes, and chewing tobacco. If you need help quitting, ask your health care provider.  · Do not use illegal drugs.  Eating and drinking    · Follow a heart-healthy diet. A dietitian can help educate you about healthy food options and changes. In general, eat  plenty of fruits and vegetables, lean meats, and whole grains.  · Avoid foods high in:  ? Sugar.  ? Salt (sodium).  ? Saturated fats, such as processed or fatty meat.  ? Trans fats, such as fried food.  · Use healthy cooking methods such as roasting, grilling, broiling, baking, poaching, steaming, or stir-frying.  · Do not drink alcohol if:  ? Your health care provider tells you not to drink.  ? You are pregnant, may be pregnant, or are planning to become pregnant.  · If you drink alcohol:  ? Limit how much you have to 0-1 drink a day.  ? Be aware of how much alcohol is in your drink. In the [place] one drink equals one 12 oz bottle of beer (355 mL), one 5 oz glass of wine (148 mL), or one 1½ oz glass of hard liquor (44 mL).  General instructions  · Manage any other health conditions, such as hypertension and diabetes. These conditions affect your heart.  · Your health care provider may ask you to monitor your blood pressure. Ideally, your blood pressure should be below 130/80.  · Keep all follow-up visits as told by your health care provider. This is important.  Get help right away if:  · You have pain in your chest, neck, ear, arm, jaw, stomach, or back that:  ? Lasts more than a few minutes.  ? Is recurring.  ? Is not relieved by taking medicine under your tongue (sublingual nitroglycerin).  · You have profuse sweating without cause.  · You have unexplained:  ? Heartburn or indigestion.  ? Shortness of breath or difficulty breathing.  ? Fluttering or fast heartbeat (palpitations).  ? Nausea or vomiting.  ? Fatigue.  ? Feelings of nervousness or anxiety.  ? Weakness.  ? Diarrhea.  · You have sudden light-headedness or dizziness.  · You faint.  · You feel like hurting yourself or think about taking your own life.  These symptoms may represent a serious problem that is an emergency. Do not wait to see if the symptoms will go away. Get medical help right away. Call your local emergency services (911 in the [place] Do  not drive yourself to the hospital.  Summary  · Coronary artery disease (CAD) is a condition in which the arteries that lead to the heart (coronary arteries) become narrow or blocked. The narrowing or blockage can lead to a heart attack.  · Many women have chest discomfort and other common symptoms of CAD. However, women often have unusual (atypical) symptoms, such as fatigue, vomiting, weakness, or dizziness.  · CAD can be treated with lifestyle changes, medicines, surgery, or a combination of these treatments.  This information is not intended to replace advice given to you by your health care provider. Make sure you discuss any questions you have with your health care provider.  Document Released: 03/11/2013 Document Revised: 09/06/2019 Document Reviewed: 08/27/2019  "[employer] Patient Education © 2020 "[employer] Inc.    Smoking Tobacco Information, Adult  Smoking tobacco can be harmful to your health. Tobacco contains a poisonous (toxic), colorless chemical called nicotine. Nicotine is addictive. It changes the brain and can make it hard to stop smoking. Tobacco also has other toxic chemicals that can hurt your body and raise your risk of many cancers.  How can smoking tobacco affect me?  Smoking tobacco puts you at risk for:  · Cancer. Smoking is most commonly associated with lung cancer, but can also lead to cancer in other parts of the body.  · Chronic obstructive pulmonary disease (COPD). This is a long-term lung condition that makes it hard to breathe. It also gets worse over time.  · High blood pressure (hypertension), heart disease, stroke, or heart attack.  · Lung infections, such as pneumonia.  · Cataracts. This is when the lenses in the eyes become clouded.  · Digestive problems. This may include peptic ulcers, heartburn, and gastroesophageal reflux disease (GERD).  · Oral health problems, such as gum disease and tooth loss.  · Loss of taste and smell.  Smoking can affect your appearance by  causing:  · Wrinkles.  · Yellow or stained teeth, fingers, and fingernails.  Smoking tobacco can also affect your social life, because:  · It may be challenging to find places to smoke when away from home. Many workplaces, restaurants, hotels, and public places are tobacco-free.  · Smoking is expensive. This is due to the cost of tobacco and the long-term costs of treating health problems from smoking.  · Secondhand smoke may affect those around you. Secondhand smoke can cause lung cancer, breathing problems, and heart disease. Children of smokers have a higher risk for:  ? Sudden infant death syndrome (SIDS).  ? Ear infections.  ? Lung infections.  If you currently smoke tobacco, quitting now can help you:  · Lead a longer and healthier life.  · Look, smell, breathe, and feel better over time.  · Save money.  · Protect others from the harms of secondhand smoke.  What actions can I take to prevent health problems?  Quit smoking    · Do not start smoking. Quit if you already do.  · Make a plan to quit smoking and commit to it. Look for programs to help you and ask your health care provider for recommendations and ideas.  · Set a date and write down all the reasons you want to quit.  · Let your friends and family know you are quitting so they can help and support you. Consider finding friends who also want to quit. It can be easier to quit with someone else, so that you can support each other.  · Talk with your health care provider about using nicotine replacement medicines to help you quit, such as gum, lozenges, patches, sprays, or pills.  · Do not replace cigarette smoking with electronic cigarettes, which are commonly called e-cigarettes. The safety of e-cigarettes is not known, and some may contain harmful chemicals.  · If you try to quit but return to smoking, stay positive. It is common to slip up when you first quit, so take it one day at a time.  · Be prepared for cravings. When you feel the urge to smoke,  chew gum or suck on hard candy.  Lifestyle  · Stay busy and take care of your body.  · Drink enough fluid to keep your urine pale yellow.  · Get plenty of exercise and eat a healthy diet. This can help prevent weight gain after quitting.  · Monitor your eating habits. Quitting smoking can cause you to have a larger appetite than when you smoke.  · Find ways to relax. Go out with friends or family to a movie or a restaurant where people do not smoke.  · Ask your health care provider about having regular tests (screenings) to check for cancer. This may include blood tests, imaging tests, and other tests.  · Find ways to manage your stress, such as meditation, yoga, or exercise.  Where to find support  To get support to quit smoking, consider:  · Asking your health care provider for more information and resources.  · Taking classes to learn more about quitting smoking.  · Looking for local organizations that offer resources about quitting smoking.  · Joining a support group for people who want to quit smoking in your local community.  · Calling the smokefree.gov counselor helpline: 5-821-Quit-Now (1-662.229.3889)  Where to find more information  You may find more information about quitting smoking from:  · HelpGuide.org: www.helpguide.org  · Smokefree.gov: smokefree.gov  · American Lung Association: www.lung.org  Contact a health care provider if you:  · Have problems breathing.  · Notice that your lips, nose, or fingers turn blue.  · Have chest pain.  · Are coughing up blood.  · Feel faint or you pass out.  · Have other health changes that cause you to worry.  Summary  · Smoking tobacco can negatively affect your health, the health of those around you, your finances, and your social life.  · Do not start smoking. Quit if you already do. If you need help quitting, ask your health care provider.  · Think about joining a support group for people who want to quit smoking in your local community. There are many effective  programs that will help you to quit this behavior.  This information is not intended to replace advice given to you by your health care provider. Make sure you discuss any questions you have with your health care provider.  Document Released: 01/02/2018 Document Revised: 09/11/2020 Document Reviewed: 01/02/2018  Elsevier Patient Education © 2020 [employer]    BMI for Adults  What is BMI?  Body mass index (BMI) is a number that is calculated from a person's weight and height. BMI can help estimate how much of a person's weight is composed of fat. BMI does not measure body fat directly. Rather, it is an alternative to procedures that directly measure body fat, which can be difficult and expensive.  BMI can help identify people who may be at higher risk for certain medical problems.  What are BMI measurements used for?  BMI is used as a screening tool to identify possible weight problems. It helps determine whether a person is obese, overweight, a healthy weight, or underweight.  BMI is useful for:  · Identifying a weight problem that may be related to a medical condition or may increase the risk for medical problems.  · Promoting changes, such as changes in diet and exercise, to help reach a healthy weight. BMI screening can be repeated to see if these changes are working.  How is BMI calculated?  BMI involves measuring your weight in relation to your height. Both height and weight are measured, and the BMI is calculated from those numbers. This can be done either in English ([place] or metric measurements. Note that charts and online BMI calculators are available to help you find your BMI quickly and easily without having to do these calculations yourself.  To calculate your BMI in English ([place] measurements:    1. Measure your weight in pounds (lb).  2. Multiply the number of pounds by 703.  ? For example, for a person who weighs 180 lb, multiply that number by 703, which equals 126,540.  3. Measure your height in  "inches. Then multiply that number by itself to get a measurement called \"inches squared.\"  ? For example, for a person who is 70 inches tall, the \"inches squared\" measurement is 70 inches x 70 inches, which equals 4,900 inches squared.  4. Divide the total from step 2 (number of lb x 703) by the total from step 3 (inches squared): 126,540 ÷ 4,900 = 25.8. This is your BMI.  To calculate your BMI in metric measurements:  1. Measure your weight in kilograms (kg).  2. Measure your height in meters (m). Then multiply that number by itself to get a measurement called \"meters squared.\"  ? For example, for a person who is 1.75 m tall, the \"meters squared\" measurement is 1.75 m x 1.75 m, which is equal to 3.1 meters squared.  3. Divide the number of kilograms (your weight) by the meters squared number. In this example: 70 ÷ 3.1 = 22.6. This is your BMI.  What do the results mean?  BMI charts are used to identify whether you are underweight, normal weight, overweight, or obese. The following guidelines will be used:  · Underweight: BMI less than 18.5.  · Normal weight: BMI between 18.5 and 24.9.  · Overweight: BMI between 25 and 29.9.  · Obese: BMI of 30 or above.  Keep these notes in mind:  · Weight includes both fat and muscle, so someone with a muscular build, such as an athlete, may have a BMI that is higher than 24.9. In cases like these, BMI is not an accurate measure of body fat.  · To determine if excess body fat is the cause of a BMI of 25 or higher, further assessments may need to be done by a health care provider.  · BMI is usually interpreted in the same way for men and women.  Where to find more information  For more information about BMI, including tools to quickly calculate your BMI, go to these websites:  · Centers for Disease Control and Prevention: www.cdc.gov  · American Heart Association: www.heart.org  · National Heart, Lung, and Blood [place]: www.nhlbi.nih.gov  Summary  · Body mass index (BMI) is a " number that is calculated from a person's weight and height.  · BMI may help estimate how much of a person's weight is composed of fat. BMI can help identify those who may be at higher risk for certain medical problems.  · BMI can be measured using English measurements or metric measurements.  · BMI charts are used to identify whether you are underweight, normal weight, overweight, or obese.  This information is not intended to replace advice given to you by your health care provider. Make sure you discuss any questions you have with your health care provider.  Document Released: 08/29/2005 Document Revised: 09/09/2020 Document Reviewed: 07/17/2020  Elsevier Patient Education © 2020 [employer] Inc.

## 2021-09-14 ENCOUNTER — TELEPHONE (OUTPATIENT)
Dept: CARDIOLOGY | Facility: CLINIC | Age: 51
End: 2021-09-14

## 2021-09-16 ENCOUNTER — TELEPHONE (OUTPATIENT)
Dept: CARDIOLOGY | Facility: CLINIC | Age: 51
End: 2021-09-16

## 2022-03-15 ENCOUNTER — TELEPHONE (OUTPATIENT)
Dept: CARDIOLOGY | Facility: CLINIC | Age: 52
End: 2022-03-15

## 2022-03-15 NOTE — TELEPHONE ENCOUNTER
"Not seen since 2020    Pt called and states she missed her last follow up appointment, with [facility] states needs to make an appointment.    BP has been running high the last 1-2 weeks, was placed on new BP medication by Dr. Alford (Benazepril -HCTZ 10/12.5 daily) and is going to start it today. (Added to med list)    146/90 hr? Today.  03/14/22 149/95 hr?  (States she has more readings at home, but not with her)    Was at the ER last PM, \"My  thought I was having a heart attack, I was having pressure in chest/ chest pain/SOB\" I went and got checked out and they told me I needed to get my stress under control \"Everything heart related was okay, that the doctor thinks its anxiety related.\"  This was a one time occurrence.      I have been been light headed x2 weeks, I am fine with sitting down, its the moment with getting up and walking around, pt states its pretty much a all day occurrence. She says \"When I notice my extreme dizziness, I check my BP and its always high\".  "

## 2022-03-15 NOTE — TELEPHONE ENCOUNTER
Printed [facility] records, no EKG in system for pt on that date.       Called and scheduled pt for 4/6 at 3:15pm.

## 2022-03-17 ENCOUNTER — TELEPHONE (OUTPATIENT)
Dept: CARDIOLOGY | Facility: CLINIC | Age: 52
End: 2022-03-17

## 2022-03-17 NOTE — TELEPHONE ENCOUNTER
PATIENT STATES AT REST H/R RUNS 84-94 AND WITH MINIMAL WALKING OR ACTIVITY IT WILL BE . STATES HAD WALKED FROM HER CAR INTO [facility] AND SHE WAS SHORT OF BREATH AND COULD FEEL HER HEART BEATING AND H/R . WENT BACK AND SAT IN HER CAR FOR APPROX. 10 MINUTES AND HER RATE CAME DOWN . HAS NOT CHECKED IT AGAIN. PATIENT COUGHED SEVERAL TIMES WHILE ON THE PHONE WITH HER. STATES SHE IS NOT ON ANY OTC MEDS OR ABX'S BUT STATES SHE PROBABLY NEEDS SOME. INSTRUCTED HER TO CONTINUE TO MONITOR HER H/R AND IF IT STARTS RACING OF GOING FAST AND IS SYMPTOMATIC SHE NEEDS TO CALL THE OFFICE BACK, OTHER WISE TO KEEP 4-6-2022 APPT. APRYL,MAXIMILIAN

## 2022-07-14 ENCOUNTER — HOSPITAL ENCOUNTER (OUTPATIENT)
Dept: HOSPITAL 79 - KOH-I | Age: 52
End: 2022-07-14
Attending: NEUROLOGICAL SURGERY
Payer: COMMERCIAL

## 2022-07-14 DIAGNOSIS — M47.9: Primary | ICD-10-CM

## 2022-07-22 ENCOUNTER — OFFICE VISIT (OUTPATIENT)
Dept: CARDIOLOGY | Facility: CLINIC | Age: 52
End: 2022-07-22

## 2022-07-22 VITALS
HEART RATE: 87 BPM | HEIGHT: 61 IN | TEMPERATURE: 97.6 F | DIASTOLIC BLOOD PRESSURE: 92 MMHG | WEIGHT: 220 LBS | OXYGEN SATURATION: 93 % | SYSTOLIC BLOOD PRESSURE: 141 MMHG | BODY MASS INDEX: 41.54 KG/M2

## 2022-07-22 DIAGNOSIS — R60.9 PERIPHERAL EDEMA: ICD-10-CM

## 2022-07-22 DIAGNOSIS — I10 PRIMARY HYPERTENSION: ICD-10-CM

## 2022-07-22 DIAGNOSIS — R00.2 PALPITATIONS: ICD-10-CM

## 2022-07-22 DIAGNOSIS — R42 DIZZINESS: ICD-10-CM

## 2022-07-22 DIAGNOSIS — F17.200 SMOKING: ICD-10-CM

## 2022-07-22 DIAGNOSIS — R06.02 SHORTNESS OF BREATH: ICD-10-CM

## 2022-07-22 DIAGNOSIS — E78.5 HYPERLIPIDEMIA, UNSPECIFIED HYPERLIPIDEMIA TYPE: ICD-10-CM

## 2022-07-22 DIAGNOSIS — I51.89 GRADE I DIASTOLIC DYSFUNCTION: ICD-10-CM

## 2022-07-22 DIAGNOSIS — I25.10 CORONARY ARTERY DISEASE INVOLVING NATIVE CORONARY ARTERY OF NATIVE HEART WITHOUT ANGINA PECTORIS: Primary | ICD-10-CM

## 2022-07-22 DIAGNOSIS — R07.89 OTHER CHEST PAIN: ICD-10-CM

## 2022-07-22 DIAGNOSIS — Z86.16 HISTORY OF COVID-19: ICD-10-CM

## 2022-07-22 PROCEDURE — 99214 OFFICE O/P EST MOD 30 MIN: CPT | Performed by: NURSE PRACTITIONER

## 2022-07-22 PROCEDURE — 93000 ELECTROCARDIOGRAM COMPLETE: CPT | Performed by: NURSE PRACTITIONER

## 2022-07-22 RX ORDER — EZETIMIBE 10 MG/1
10 TABLET ORAL DAILY
COMMUNITY
End: 2023-01-10 | Stop reason: ALTCHOICE

## 2022-07-22 RX ORDER — METOPROLOL SUCCINATE 25 MG/1
25 TABLET, EXTENDED RELEASE ORAL DAILY
COMMUNITY
End: 2023-01-10 | Stop reason: SDUPTHER

## 2022-07-22 NOTE — PROGRESS NOTES
Brynn Marquez Clothier is a 52 y.o. female     Chief Complaint   Patient presents with   • Follow-up     HTN        HPI    Problem list:    1.  Nonobstructive CAD  1.1 stress test 9/25/2020-very mild lateral and posterior lateral wall ischemia, post-rest EF 73%  1.2 left heart cath 10/21/2020-minimal irregularities in the circumflex, LAD and RCA, EF 65%, LVEDP 12-14  2.  Hyperlipidemia  3.  Palpitations  3.1 event monitor 6/11-6/12/2020-sinus tach  4.  Family history of early coronary artery disease  5.  Shortness of breath  5.1 echo 9/25/2020-EF 60 to 65%, diastolic dysfunction 1, trivial to mild TR, PA in the 20s  6.  Borderline diabetes mellitus type 2  7.  Peripheral edema  8.  Smoking habituation  9. History of COVID-19 2020; 12/2021    Patient is a 52-year-old female presents today for follow-up.  She has been having what she describes as left anterior near her shoulder and into her throat chest discomfort.  She says it may occur every couple weeks.  She has used nitro 1 time and it did help.  She says but it did make her headache a little worse.  Patient says whenever it happens she will get short of breath and diaphoretic.  Patient says that she does have palpitations usually when she is walking or gets upset.  She has dizziness never her heart races.  She denies any presyncope, syncope, orthopnea or PND.  Patient has swelling in her legs but it is just related to her knee.  She says she needs right knee surgery.  Patient has shortness of breath with minimal activity and also in the shower.  She feels like she cristine a lot.  She says she has diffuse fatigue but she has a lot of stress.  Patient did go to the ER on 3/14 and her creatinine was 0.95, mag 1.9 and K was 3.6.    We went over ER records.    Current Outpatient Medications on File Prior to Visit   Medication Sig Dispense Refill   • ezetimibe (Zetia) 10 MG tablet Take 10 mg by mouth Daily.     • metFORMIN (GLUCOPHAGE) 500 MG tablet Take  500 mg by mouth Daily With Breakfast.     • metoprolol succinate XL (TOPROL-XL) 25 MG 24 hr tablet Take 25 mg by mouth Daily.     • nitroglycerin (NITROSTAT) 0.4 MG SL tablet 1 under the tongue as needed for angina, may repeat q5mins for up three doses 30 tablet 5   • [DISCONTINUED] ibuprofen (ADVIL,MOTRIN) 800 MG tablet Take 800 mg by mouth 2 (Two) Times a Day As Needed.     • aspirin 81 MG tablet Take 1 tablet by mouth Daily. 30 tablet 11   • [DISCONTINUED] atorvastatin (LIPITOR) 40 MG tablet Take 1 tablet by mouth Daily. 90 tablet 3   • [DISCONTINUED] benazepril-hydrochlorthiazide (LOTENSIN HCT) 10-12.5 MG per tablet Take 1 tablet by mouth Daily.     • [DISCONTINUED] furosemide (LASIX) 20 MG tablet Take 1 tablet by mouth Daily As Needed (edema). 30 tablet 11   • [DISCONTINUED] pantoprazole (PROTONIX) 40 MG EC tablet Take 40 mg by mouth Daily.       No current facility-administered medications on file prior to visit.       ALLERGIES    Lisinopril    Past Medical History:   Diagnosis Date   • Arthritis    • Asthma    • B12 deficiency    • Back pain    • Bronchitis    • Chronic kidney disease     kidney stones   • COVID     2020 , 12/2021   • COVID-19 vaccine administered     07/17/2021,08/07/2021, 02/05/2022 - Pf[employer]   • Depression    • Diabetes mellitus (HCC)    • Diverticulosis    • Fatty liver    • Generalized headaches    • GERD (gastroesophageal reflux disease)    • High cholesterol    • Hyperlipidemia    • Hypertension    • Pancreatitis    • Pneumonia    • Wears dentures     UPPER       Social History     Socioeconomic History   • Marital status:    Tobacco Use   • Smoking status: Current Some Day Smoker     Packs/day: 0.25     Years: 17.00     Pack years: 4.25     Types: Cigarettes   • Smokeless tobacco: Never Used   Vaping Use   • Vaping Use: Every day   • Substances: Nicotine   • Devices: Disposable   Substance and Sexual Activity   • Alcohol use: Not Currently     Comment: social   • Drug use:  Never   • Sexual activity: Defer       Family History   Problem Relation Age of Onset   • Liver disease Father    • Kidney cancer Father    • Heart attack Father    • Hypertension Father    • Hyperlipidemia Father    • Colon cancer Maternal Grandfather    • Diabetes Mother        Review of Systems   Constitutional: Positive for diaphoresis (with CP ) and fatigue (sever fatigued ). Negative for appetite change, chills and fever.   HENT: Negative for congestion, rhinorrhea and sore throat.    Eyes: Positive for visual disturbance (glasses ).   Respiratory: Positive for shortness of breath (walking , and when she gets out of the shower, feels like smothering; with CP  ). Negative for cough, chest tightness and wheezing.    Cardiovascular: Positive for chest pain (when she is really stressed; left anterior near shoulder and in throat; occurs maybe every couple of weeks, has used nitro 1 time; did help some, but made h/a a little worse), palpitations (races, fluttering and pounds when she is walking or when she gets upset ) and leg swelling (legs, feet and ankles due to ( knee sx) ).   Gastrointestinal: Positive for constipation (chronic constipation her entire life ). Negative for abdominal pain, blood in stool, diarrhea, nausea and vomiting.   Endocrine: Positive for heat intolerance (Night sweats ). Negative for cold intolerance.   Genitourinary: Positive for frequency (several times a day and night ) and urgency (hard to hold and control at times ). Negative for difficulty urinating, dysuria and hematuria.   Musculoskeletal: Positive for arthralgias (knees, left shoulder , back ), back pain (lower and upper back pain ), gait problem (uses a cane ), joint swelling (knees ) and neck pain (left side of the neck and jaw pain ). Negative for neck stiffness.        Needs right knee surgery, history of left knee surgery   Skin: Positive for color change (redness around the front of her neck ). Negative for pallor, rash and  "wound.   Allergic/Immunologic: Negative for environmental allergies and food allergies.   Neurological: Positive for dizziness (when heart races; or if bends head down), weakness (weakness in her knees and legs ), numbness (feet and legs ) and headaches (with elevated BP). Negative for light-headedness.   Hematological: Bruises/bleeds easily (bleeds easy ).   Psychiatric/Behavioral: Positive for sleep disturbance (hard to go to sleep , she tosses and turns off and on all night she will get around 4 hrs a night ).       Objective   /92 (BP Location: Left arm, Patient Position: Sitting)   Pulse 87   Temp 97.6 °F (36.4 °C)   Ht 154.9 cm (61\")   Wt 99.8 kg (220 lb)   SpO2 93%   BMI 41.57 kg/m²   Vitals:    07/22/22 0746   BP: 141/92   BP Location: Left arm   Patient Position: Sitting   Pulse: 87   Temp: 97.6 °F (36.4 °C)   SpO2: 93%   Weight: 99.8 kg (220 lb)   Height: 154.9 cm (61\")      Lab Results (most recent)     None        Physical Exam  Vitals reviewed.   Constitutional:       General: She is awake.      Appearance: Normal appearance. She is well-developed and well-groomed. She is morbidly obese.   HENT:      Head: Normocephalic.   Eyes:      General: Lids are normal.   Neck:      Vascular: No carotid bruit, hepatojugular reflux or JVD.   Cardiovascular:      Rate and Rhythm: Normal rate and regular rhythm.      Pulses:           Radial pulses are 2+ on the right side and 2+ on the left side.        Dorsalis pedis pulses are 2+ on the right side and 2+ on the left side.        Posterior tibial pulses are 2+ on the right side and 2+ on the left side.      Heart sounds: Normal heart sounds.   Pulmonary:      Effort: Pulmonary effort is normal.      Breath sounds: Normal air entry. Examination of the right-middle field reveals decreased breath sounds. Examination of the left-middle field reveals decreased breath sounds. Examination of the right-lower field reveals decreased breath sounds. Examination of " the left-lower field reveals decreased breath sounds. Decreased breath sounds present.   Abdominal:      General: Bowel sounds are normal.      Palpations: Abdomen is soft.   Musculoskeletal:      Right knee: Swelling present. Tenderness present.      Right lower leg: No edema.      Left lower leg: No edema.   Skin:     General: Skin is warm and dry.      Findings: Erythema (around neck ) present.   Neurological:      Mental Status: She is alert and oriented to person, place, and time.   Psychiatric:         Attention and Perception: Attention and perception normal.         Mood and Affect: Mood and affect normal.         Speech: Speech normal.         Behavior: Behavior normal. Behavior is cooperative.         Thought Content: Thought content normal.         Cognition and Memory: Cognition and memory normal.         Judgment: Judgment normal.         Procedure     ECG 12 Lead    Date/Time: 7/22/2022 8:12 AM  Performed by: Isela Cutler APRN  Authorized by: Isela Cutler APRN   Comparison: compared with previous ECG from 9/9/2020  Comparison to previous ECG: St depression today  Rhythm: sinus rhythm  Rate: normal  BPM: 86  ST Depression: III and aVF  QRS axis: normal    Clinical impression: abnormal EKG                 Assessment & Plan      Diagnosis Plan   1. Coronary artery disease involving native coronary artery of native heart without angina pectoris  Adult Transthoracic Echo Complete W/ Cont if Necessary Per Protocol    Stress Test With Myocardial Perfusion One Day   2. Primary hypertension  Adult Transthoracic Echo Complete W/ Cont if Necessary Per Protocol    Stress Test With Myocardial Perfusion One Day   3. Hyperlipidemia, unspecified hyperlipidemia type  Stress Test With Myocardial Perfusion One Day   4. Grade I diastolic dysfunction  Adult Transthoracic Echo Complete W/ Cont if Necessary Per Protocol   5. Palpitations  Adult Transthoracic Echo Complete W/ Cont if Necessary Per Protocol    Stress  Test With Myocardial Perfusion One Day   6. Shortness of breath  Adult Transthoracic Echo Complete W/ Cont if Necessary Per Protocol    Stress Test With Myocardial Perfusion One Day   7. Peripheral edema     8. Smoking  Adult Transthoracic Echo Complete W/ Cont if Necessary Per Protocol    Stress Test With Myocardial Perfusion One Day   9. History of COVID-19  Adult Transthoracic Echo Complete W/ Cont if Necessary Per Protocol    Stress Test With Myocardial Perfusion One Day   10. Dizziness  Duplex Carotid Ultrasound CAR       Return in about 12 weeks (around 10/14/2022).    CAD hypertension/hyperlipidemia/diastolic dysfunction/palpitations shortness of breath/smoking/history of COVID-patient will have an ischemia work-up, stress and echo.  Patient will need a Lexiscan as she is unable to walk on a treadmill she is pending right knee replacement.  Peripheral edema-diastolic dysfunction-no signs of failure.  Dizziness-patient have carotid artery ultrasound.  Patient will continue her medication regimen.  She was encouraged to use nitro as needed for chest pain no resolution she will go to the ER.  Patient will follow-up in 12 weeks or sooner if any changes or abnormalities with testing.       Kandy Marquez Clothier  reports that she has been smoking cigarettes. She has a 4.25 pack-year smoking history. She has never used smokeless tobacco.. I have educated her on the risk of diseases from using tobacco products such as cancer, COPD and heart disease.     I advised her to quit and she is not willing to quit.    I spent 3  minutes counseling the patient.         Patient did not bring med list or medicine bottles to appointment, med list has been reviewed and updated based on patient's knowledge of their meds.       Electronically signed by:

## 2022-08-10 DIAGNOSIS — R07.89 OTHER CHEST PAIN: ICD-10-CM

## 2022-08-10 RX ORDER — NITROGLYCERIN 0.4 MG/1
TABLET SUBLINGUAL
Qty: 25 TABLET | Refills: 5 | Status: SHIPPED | OUTPATIENT
Start: 2022-08-10 | End: 2023-01-10 | Stop reason: SDUPTHER

## 2022-08-23 ENCOUNTER — TELEPHONE (OUTPATIENT)
Dept: CARDIOLOGY | Facility: CLINIC | Age: 52
End: 2022-08-23

## 2022-08-23 NOTE — TELEPHONE ENCOUNTER
Patient called and says she has noticed she has done a lot better the last couple of months on the metoprolol w/  Hr/BP. Has noticed the last few days will get a bad headache and BP will be ranging 152/98 and sometimes 24890, after medication. Wanted to make ABA Coats aware.    Has noticed a lot of pressure in chest/burping poss caused from acid reflux.  No sob, chest pain,swelling.

## 2022-08-23 NOTE — TELEPHONE ENCOUNTER
Patient states she does have a lot of stress at home. Close family members had positive Covid a few weeks prior, she tested negative but was in contact. Does not eat a lot of sodium.

## 2022-08-30 ENCOUNTER — HOSPITAL ENCOUNTER (OUTPATIENT)
Dept: CARDIOLOGY | Facility: HOSPITAL | Age: 52
Discharge: HOME | End: 2022-08-30

## 2022-08-30 DIAGNOSIS — I25.10 CORONARY ARTERY DISEASE INVOLVING NATIVE CORONARY ARTERY OF NATIVE HEART WITHOUT ANGINA PECTORIS: ICD-10-CM

## 2022-08-30 DIAGNOSIS — R06.02 SHORTNESS OF BREATH: ICD-10-CM

## 2022-08-30 DIAGNOSIS — R00.2 PALPITATIONS: ICD-10-CM

## 2022-08-30 DIAGNOSIS — R07.89 OTHER CHEST PAIN: ICD-10-CM

## 2022-08-30 DIAGNOSIS — E78.5 HYPERLIPIDEMIA, UNSPECIFIED HYPERLIPIDEMIA TYPE: ICD-10-CM

## 2022-08-30 DIAGNOSIS — F17.200 SMOKING: ICD-10-CM

## 2022-08-30 DIAGNOSIS — I10 PRIMARY HYPERTENSION: ICD-10-CM

## 2022-08-30 DIAGNOSIS — Z86.16 HISTORY OF COVID-19: ICD-10-CM

## 2022-08-30 DIAGNOSIS — I51.89 GRADE I DIASTOLIC DYSFUNCTION: ICD-10-CM

## 2022-08-30 DIAGNOSIS — R42 DIZZINESS: ICD-10-CM

## 2022-08-30 LAB
BH CV NUCLEAR PRIOR STUDY: 3
BH CV STRESS COMMENTS STAGE 1: NORMAL
BH CV STRESS DOSE REGADENOSON STAGE 1: 0.4
BH CV STRESS DURATION MIN STAGE 1: 0
BH CV STRESS DURATION SEC STAGE 1: 10
BH CV STRESS PROTOCOL 1: NORMAL
BH CV STRESS RECOVERY BP: NORMAL MMHG
BH CV STRESS RECOVERY HR: 103 BPM
BH CV STRESS STAGE 1: 1
MAXIMAL PREDICTED HEART RATE: 170 BPM
PERCENT MAX PREDICTED HR: 58.24 %
STRESS BASELINE BP: NORMAL MMHG
STRESS BASELINE HR: 70 BPM
STRESS PERCENT HR: 69 %
STRESS POST PEAK BP: NORMAL MMHG
STRESS POST PEAK HR: 99 BPM
STRESS TARGET HR: 145 BPM

## 2022-08-30 PROCEDURE — 78452 HT MUSCLE IMAGE SPECT MULT: CPT

## 2022-08-30 PROCEDURE — 0 TECHNETIUM SESTAMIBI: Performed by: INTERNAL MEDICINE

## 2022-08-30 PROCEDURE — A9500 TC99M SESTAMIBI: HCPCS | Performed by: INTERNAL MEDICINE

## 2022-08-30 PROCEDURE — 93306 TTE W/DOPPLER COMPLETE: CPT | Performed by: INTERNAL MEDICINE

## 2022-08-30 PROCEDURE — 93880 EXTRACRANIAL BILAT STUDY: CPT

## 2022-08-30 PROCEDURE — 93017 CV STRESS TEST TRACING ONLY: CPT

## 2022-08-30 PROCEDURE — 78452 HT MUSCLE IMAGE SPECT MULT: CPT | Performed by: INTERNAL MEDICINE

## 2022-08-30 PROCEDURE — 93880 EXTRACRANIAL BILAT STUDY: CPT | Performed by: INTERNAL MEDICINE

## 2022-08-30 PROCEDURE — 25010000002 REGADENOSON 0.4 MG/5ML SOLUTION: Performed by: NURSE PRACTITIONER

## 2022-08-30 PROCEDURE — 93306 TTE W/DOPPLER COMPLETE: CPT

## 2022-08-30 PROCEDURE — 93018 CV STRESS TEST I&R ONLY: CPT | Performed by: INTERNAL MEDICINE

## 2022-08-30 RX ADMIN — TECHNETIUM TC 99M SESTAMIBI 1 DOSE: 1 INJECTION INTRAVENOUS at 10:22

## 2022-08-30 RX ADMIN — REGADENOSON 0.4 MG: 0.08 INJECTION, SOLUTION INTRAVENOUS at 10:01

## 2022-08-30 RX ADMIN — TECHNETIUM TC 99M SESTAMIBI 1 DOSE: 1 INJECTION INTRAVENOUS at 08:38

## 2022-08-31 ENCOUNTER — TELEPHONE (OUTPATIENT)
Dept: CARDIOLOGY | Facility: CLINIC | Age: 52
End: 2022-08-31

## 2022-08-31 LAB
BH CV XLRA MEAS LEFT DIST CCA EDV: -38.5 CM/SEC
BH CV XLRA MEAS LEFT DIST CCA PSV: -95.9 CM/SEC
BH CV XLRA MEAS LEFT DIST ICA EDV: -32.1 CM/SEC
BH CV XLRA MEAS LEFT DIST ICA PSV: -87.3 CM/SEC
BH CV XLRA MEAS LEFT ICA/CCA RATIO: 1.02
BH CV XLRA MEAS LEFT MID ICA EDV: -44.6 CM/SEC
BH CV XLRA MEAS LEFT MID ICA PSV: -97.4 CM/SEC
BH CV XLRA MEAS LEFT PROX CCA EDV: 41.6 CM/SEC
BH CV XLRA MEAS LEFT PROX CCA PSV: 102.9 CM/SEC
BH CV XLRA MEAS LEFT PROX ECA EDV: -18.2 CM/SEC
BH CV XLRA MEAS LEFT PROX ECA PSV: -72.9 CM/SEC
BH CV XLRA MEAS LEFT PROX ICA EDV: -32.7 CM/SEC
BH CV XLRA MEAS LEFT PROX ICA PSV: -93 CM/SEC
BH CV XLRA MEAS LEFT VERTEBRAL A EDV: -18.1 CM/SEC
BH CV XLRA MEAS LEFT VERTEBRAL A PSV: -36.6 CM/SEC
BH CV XLRA MEAS RIGHT DIST CCA EDV: -25.9 CM/SEC
BH CV XLRA MEAS RIGHT DIST CCA PSV: -83.3 CM/SEC
BH CV XLRA MEAS RIGHT DIST ICA EDV: -38 CM/SEC
BH CV XLRA MEAS RIGHT DIST ICA PSV: -93.2 CM/SEC
BH CV XLRA MEAS RIGHT ICA/CCA RATIO: 1.25
BH CV XLRA MEAS RIGHT MID ICA EDV: -40.8 CM/SEC
BH CV XLRA MEAS RIGHT MID ICA PSV: -93.7 CM/SEC
BH CV XLRA MEAS RIGHT PROX CCA EDV: 27.6 CM/SEC
BH CV XLRA MEAS RIGHT PROX CCA PSV: 89.3 CM/SEC
BH CV XLRA MEAS RIGHT PROX ECA EDV: -23.7 CM/SEC
BH CV XLRA MEAS RIGHT PROX ECA PSV: -89.9 CM/SEC
BH CV XLRA MEAS RIGHT PROX ICA EDV: -35.8 CM/SEC
BH CV XLRA MEAS RIGHT PROX ICA PSV: -103.1 CM/SEC
BH CV XLRA MEAS RIGHT VERTEBRAL A EDV: -16.9 CM/SEC
BH CV XLRA MEAS RIGHT VERTEBRAL A PSV: -40.9 CM/SEC
MAXIMAL PREDICTED HEART RATE: 170 BPM
STRESS TARGET HR: 145 BPM

## 2022-08-31 NOTE — TELEPHONE ENCOUNTER
STRESS  Pt notified of no acute findings. Provider will discuss results at f/u. Pt reminded of appt date and time.  ----- Message from ABA Belcher sent at 8/31/2022  6:20 AM EDT -----  Please advise patient.

## 2022-08-31 NOTE — TELEPHONE ENCOUNTER
US CAROTID  Pt notified of no acute findings. Provider will discuss results at f/u. Pt reminded of appt date and time.  ----- Message from ABA Belcher sent at 8/31/2022  2:15 PM EDT -----  Please advise patient, on ASA and zetia

## 2022-09-01 ENCOUNTER — TELEPHONE (OUTPATIENT)
Dept: CARDIOLOGY | Facility: CLINIC | Age: 52
End: 2022-09-01

## 2022-09-01 LAB
BH CV ECHO MEAS - ACS: 2.05 CM
BH CV ECHO MEAS - AO MAX PG: 6.7 MMHG
BH CV ECHO MEAS - AO MEAN PG: 3.6 MMHG
BH CV ECHO MEAS - AO ROOT DIAM: 2.8 CM
BH CV ECHO MEAS - AO V2 MAX: 129 CM/SEC
BH CV ECHO MEAS - AO V2 VTI: 30.4 CM
BH CV ECHO MEAS - EDV(CUBED): 49.8 ML
BH CV ECHO MEAS - EDV(MOD-SP4): 52 ML
BH CV ECHO MEAS - EF(MOD-SP4): 60 %
BH CV ECHO MEAS - EF_3D-VOL: 55 %
BH CV ECHO MEAS - ESV(CUBED): 7.6 ML
BH CV ECHO MEAS - ESV(MOD-SP4): 20.8 ML
BH CV ECHO MEAS - FS: 46.5 %
BH CV ECHO MEAS - IVS/LVPW: 1.46 CM
BH CV ECHO MEAS - IVSD: 1.58 CM
BH CV ECHO MEAS - LA DIMENSION: 3.5 CM
BH CV ECHO MEAS - LAT PEAK E' VEL: 10.6 CM/SEC
BH CV ECHO MEAS - LV DIASTOLIC VOL/BSA (35-75): 26.4 CM2
BH CV ECHO MEAS - LV MASS(C)D: 171.2 GRAMS
BH CV ECHO MEAS - LV SYSTOLIC VOL/BSA (12-30): 10.6 CM2
BH CV ECHO MEAS - LVIDD: 3.7 CM
BH CV ECHO MEAS - LVIDS: 1.97 CM
BH CV ECHO MEAS - LVOT AREA: 2.36 CM2
BH CV ECHO MEAS - LVOT DIAM: 1.73 CM
BH CV ECHO MEAS - LVPWD: 1.08 CM
BH CV ECHO MEAS - MED PEAK E' VEL: 6.6 CM/SEC
BH CV ECHO MEAS - MV A MAX VEL: 58.7 CM/SEC
BH CV ECHO MEAS - MV DEC SLOPE: 270.4 CM/SEC2
BH CV ECHO MEAS - MV E MAX VEL: 80.1 CM/SEC
BH CV ECHO MEAS - MV E/A: 1.36
BH CV ECHO MEAS - RVDD: 2.7 CM
BH CV ECHO MEAS - SI(MOD-SP4): 15.9 ML/M2
BH CV ECHO MEAS - SV(MOD-SP4): 31.2 ML
BH CV ECHO MEASUREMENTS AVERAGE E/E' RATIO: 9.31
LEFT ATRIUM VOLUME INDEX: 19.7 ML/M2
MAXIMAL PREDICTED HEART RATE: 170 BPM
STRESS TARGET HR: 145 BPM

## 2022-09-06 ENCOUNTER — TELEPHONE (OUTPATIENT)
Dept: CARDIOLOGY | Facility: CLINIC | Age: 52
End: 2022-09-06

## 2022-09-06 NOTE — TELEPHONE ENCOUNTER
Echo:    · Calculated left ventricular 3D EF = 55% Left ventricular ejection fraction appears to be 56 - 60%.  · Left ventricular wall thickness is consistent with borderline concentric hypertrophy.  · Left ventricular diastolic function was normal.     Technically adequate study.     1.  LV size, function, and wall motion are normal.  Visually estimated ejection fraction is 50 to 55%.  3D ejection fraction is 55%.  Mild concentric left ventricular hypertrophy with grade 1A diastolic dysfunction.  Borderline left atrial enlargement.  Right heart chambers are normal.  No septal defect or intracavitary mass or thrombus.     2.  The mitral valve is morphologically normal except for very minimal sclerosis of the posterior mitral leaflet.  There is no mitral stenosis and there is trivial MR.  The aortic valve is not well visualized but appears grossly normal.  Doppler excludes aortic stenosis and significant aortic insufficiency.  There is trivial to mild TR from a morphologically normal valve.     3.  No pericardial or great vessel pathology.     4.  PA systolic pressures are estimated in the low 30s.

## 2022-09-06 NOTE — TELEPHONE ENCOUNTER
----- Message from Edelmira Cooper sent at 9/6/2022  1:20 PM EDT -----    ----- Message -----  From: Isela Cutler APRN  Sent: 9/2/2022   6:17 AM EDT  To: YAMILA Banuelos    Please advise patient.

## 2022-11-15 ENCOUNTER — TELEPHONE (OUTPATIENT)
Dept: CARDIOLOGY | Facility: CLINIC | Age: 52
End: 2022-11-15

## 2022-11-15 NOTE — TELEPHONE ENCOUNTER
Caller: Kandy Noble Alma    Relationship to patient: Self    Best call back number: [phone]    Chief complaint: INTERMITTENT INCREASED BLOOD PRESSURE WITH ASSOCITED SOBOE AND HEADACHE    Type of visit: FU    Requested date: ASAP     If rescheduling, when is the original appointment: 11.16.22     Additional notes: PTS FATHER PASSED AWAY AND HAD TO CANCEL APPT - NEXT AVAIL IS IN MARCH, PT WOULD LIKE TO BE SEEN SOONER THAN MARCH

## 2022-11-17 ENCOUNTER — TELEPHONE (OUTPATIENT)
Dept: CARDIOLOGY | Facility: CLINIC | Age: 52
End: 2022-11-17

## 2022-11-17 NOTE — TELEPHONE ENCOUNTER
Caller: Kandy Noble    Relationship to patient: Self    Best call back number: [phone]    Patient is needing: PT WAS PRESCRIBED OZEMPIC BY HER PCP TODAY AND PLANS ON STARTING TOMORROW. PT MAKING SURE THAT IT IS OK FOR HER TO TAKE WITH HER HEART CONDITION.

## 2022-11-17 NOTE — TELEPHONE ENCOUNTER
Isela Miguel, Melinda Savage LPN  Caller: Unspecified (Today,  4:08 PM)  I do not see any contraindication for this.    PATIENT CALLED, AWARE. PH,LPN

## 2023-01-10 ENCOUNTER — OFFICE VISIT (OUTPATIENT)
Dept: CARDIOLOGY | Facility: CLINIC | Age: 53
End: 2023-01-10
Payer: COMMERCIAL

## 2023-01-10 VITALS
TEMPERATURE: 96.9 F | OXYGEN SATURATION: 98 % | BODY MASS INDEX: 40.78 KG/M2 | DIASTOLIC BLOOD PRESSURE: 91 MMHG | HEIGHT: 61 IN | WEIGHT: 216 LBS | SYSTOLIC BLOOD PRESSURE: 137 MMHG | HEART RATE: 87 BPM

## 2023-01-10 DIAGNOSIS — R00.2 PALPITATIONS: ICD-10-CM

## 2023-01-10 DIAGNOSIS — R60.9 PERIPHERAL EDEMA: ICD-10-CM

## 2023-01-10 DIAGNOSIS — R06.02 SHORTNESS OF BREATH: ICD-10-CM

## 2023-01-10 DIAGNOSIS — I51.89 GRADE I DIASTOLIC DYSFUNCTION: ICD-10-CM

## 2023-01-10 DIAGNOSIS — I65.23 BILATERAL CAROTID ARTERY STENOSIS: ICD-10-CM

## 2023-01-10 DIAGNOSIS — E78.5 HYPERLIPIDEMIA, UNSPECIFIED HYPERLIPIDEMIA TYPE: ICD-10-CM

## 2023-01-10 DIAGNOSIS — I10 PRIMARY HYPERTENSION: ICD-10-CM

## 2023-01-10 DIAGNOSIS — F17.200 SMOKING: ICD-10-CM

## 2023-01-10 DIAGNOSIS — I25.10 CORONARY ARTERY DISEASE INVOLVING NATIVE CORONARY ARTERY OF NATIVE HEART WITHOUT ANGINA PECTORIS: Primary | ICD-10-CM

## 2023-01-10 PROCEDURE — 99214 OFFICE O/P EST MOD 30 MIN: CPT | Performed by: NURSE PRACTITIONER

## 2023-01-10 RX ORDER — GABAPENTIN 100 MG/1
100 CAPSULE ORAL 3 TIMES DAILY
COMMUNITY

## 2023-01-10 RX ORDER — HYDROCODONE BITARTRATE AND ACETAMINOPHEN 7.5; 325 MG/1; MG/1
1 TABLET ORAL 3 TIMES DAILY
COMMUNITY

## 2023-01-10 RX ORDER — METOPROLOL SUCCINATE 50 MG/1
50 TABLET, EXTENDED RELEASE ORAL DAILY
Qty: 90 TABLET | Refills: 3 | Status: SHIPPED | OUTPATIENT
Start: 2023-01-10 | End: 2023-01-24

## 2023-01-10 RX ORDER — POTASSIUM CHLORIDE 750 MG/1
10 TABLET, FILM COATED, EXTENDED RELEASE ORAL DAILY PRN
Qty: 90 TABLET | Refills: 3 | Status: SHIPPED | OUTPATIENT
Start: 2023-01-10

## 2023-01-10 RX ORDER — NICOTINE 21 MG/24HR
1 PATCH, TRANSDERMAL 24 HOURS TRANSDERMAL EVERY 24 HOURS
Qty: 7 EACH | Refills: 0 | Status: SHIPPED | OUTPATIENT
Start: 2023-01-10 | End: 2023-02-06

## 2023-01-10 RX ORDER — FUROSEMIDE 20 MG/1
20 TABLET ORAL DAILY PRN
Qty: 90 TABLET | Refills: 3 | Status: SHIPPED | OUTPATIENT
Start: 2023-01-10

## 2023-01-10 RX ORDER — NITROGLYCERIN 0.4 MG/1
TABLET SUBLINGUAL
Qty: 45 TABLET | Refills: 2 | Status: SHIPPED | OUTPATIENT
Start: 2023-01-10

## 2023-01-10 RX ORDER — ATORVASTATIN CALCIUM 20 MG/1
20 TABLET, FILM COATED ORAL DAILY
COMMUNITY

## 2023-01-10 NOTE — LETTER
January 10, 2023     ABA Gupta  [address] [facility] [address]    Patient: Kandy Noble   YOB: 1970   Date of Visit: 1/10/2023       Dear ABA Nicholas:    Thank you for referring Kandy Noble to me for evaluation. Below are the relevant portions of my assessment and plan of care.    If you have questions, please do not hesitate to call me. I look forward to following Kandy along with you.         Sincerely,        ABA Rivera        CC: No Recipients  Isela Miguel APRN  01/10/23 1106  Signed  Subjective    Kandy Noble is a 52 y.o. female     Chief Complaint   Patient presents with   • Follow-up       HPI    Problem list:    1.  Nonobstructive CAD  1.1 stress test 9/25/2020-very mild lateral and posterior lateral wall ischemia, post-rest EF 73%  1.2 left heart cath 10/21/2020-minimal irregularities in the circumflex, LAD and RCA, EF 65%, LVEDP 12-14  1.3 stress test 8/30/2022-no evidence of ischemia, post-rest EF 73%  2.  Hyperlipidemia  3.  Palpitations  3.1 event monitor 6/11-6/12/2020-sinus tach  4.  Family history of early coronary artery disease  5.  Shortness of breath  5.1 echo 9/25/2020-EF 60 to 65%, diastolic dysfunction 1, trivial to mild TR, PA in the 20s  5.2 Echo/3/2022-EF 56 to 60%, borderline LVH, diastolic dysfunction 1 trivial MR and trivial to mild TR PA in the low 30s  6.  Borderline diabetes mellitus type 2  7.  Peripheral edema  8.  Smoking habituation  9. History of COVID-19 2020; 12/2021  10.  Carotid artery disease  10.1 carotid artery ultrasound 8/3/2022-16 to 49% stenosis both internal carotid arteries, antegrade flow vertebral arteries  11.  Hypertension    Patient is a 52-year-old female who presents today for a follow-up on testing.  She denies any chest pain or pressure.  She still feels like she has fluttering pretty often.  She notices it more when it sits.  She says she will feel it speed up a little bit and then  will get her attention and then will stop.  She denies any dizziness, presyncope, syncope, orthopnea or PND.  She does get swelling in her legs and will have to prop them up on a pillow at night to get them to go down.  Patient says she does have shortness of breath with walking any distance but feels like this really has not changed.  She is fatigued all the time.  Patient says her blood pressure does stay elevated.    We went over stress, carotid and echo.  We are still waiting for labs from PCP.    Current Outpatient Medications on File Prior to Visit   Medication Sig Dispense Refill   • aspirin 81 MG tablet Take 1 tablet by mouth Daily. 30 tablet 11   • atorvastatin (LIPITOR) 20 MG tablet Take 20 mg by mouth Daily.     • gabapentin (NEURONTIN) 100 MG capsule Take 100 mg by mouth 3 (Three) Times a Day.     • HYDROcodone-acetaminophen (NORCO) 7.5-325 MG per tablet Take 1 tablet by mouth 3 (Three) Times a Day.     • metFORMIN (GLUCOPHAGE) 500 MG tablet Take 500 mg by mouth Daily With Breakfast.     • [DISCONTINUED] ezetimibe (ZETIA) 10 MG tablet Take 10 mg by mouth Daily.     • [DISCONTINUED] metoprolol succinate XL (TOPROL-XL) 25 MG 24 hr tablet Take 25 mg by mouth Daily.     • [DISCONTINUED] nitroglycerin (NITROSTAT) 0.4 MG SL tablet 1 under the tongue as needed for angina, may repeat q5mins for up three doses 25 tablet 5     No current facility-administered medications on file prior to visit.       ALLERGIES    Lisinopril    Past Medical History:   Diagnosis Date   • Arthritis    • Asthma    • B12 deficiency    • Back pain    • Bronchitis    • Chronic kidney disease     kidney stones   • COVID     2020 , 12/2021   • COVID-19 vaccine administered     07/17/2021,08/07/2021, 02/05/2022 - Pfizer   • Depression    • Diabetes mellitus (HCC)    • Diverticulosis    • Fatty liver    • Generalized headaches    • GERD (gastroesophageal reflux disease)    • High cholesterol    • Hyperlipidemia    • Hypertension    •  Pancreatitis    • Pneumonia    • Wears dentures     UPPER       Social History     Socioeconomic History   • Marital status:    Tobacco Use   • Smoking status: Some Days     Packs/day: 0.25     Years: 17.00     Pack years: 4.25     Types: Cigarettes   • Smokeless tobacco: Never   Vaping Use   • Vaping Use: Every day   • Substances: Nicotine   • Devices: Disposable   Substance and Sexual Activity   • Alcohol use: Not Currently     Comment: social   • Drug use: Never   • Sexual activity: Defer       Family History   Problem Relation Age of Onset   • Liver disease Father    • Kidney cancer Father    • Heart attack Father    • Hypertension Father    • Hyperlipidemia Father    • Colon cancer Maternal Grandfather    • Diabetes Mother        Review of Systems   Constitutional: Positive for fatigue (fatigued all the time ). Negative for appetite change, chills, diaphoresis and fever.   HENT: Negative for congestion, rhinorrhea and sore throat.    Eyes: Positive for visual disturbance (glasses).   Respiratory: Positive for shortness of breath (walking at any distance; feels like no change ). Negative for cough, chest tightness and wheezing.    Cardiovascular: Positive for palpitations (fluttering; pretty often; she notices it when she is sitting ) and leg swelling (legs, feet and ankles ( swelling will go down at night ) legs , ankles are the worse;  props at night and it helps ). Negative for chest pain.   Gastrointestinal: Positive for nausea (due to meds ). Negative for abdominal pain, blood in stool, constipation, diarrhea and vomiting.   Endocrine: Positive for heat intolerance (Night sweats ).   Genitourinary: Positive for frequency (several times a day and night ), hematuria (2 months ago she is seeing Urology ) and urgency (hard to hold and control at times ). Negative for difficulty urinating and dyspareunia.   Musculoskeletal: Positive for arthralgias (back , left shoulder , knees and legs ), back pain  (lower back and upper back ), gait problem (uses a cane ) and joint swelling (legs ). Negative for neck pain and neck stiffness.   Skin: Negative for color change, pallor, rash and wound.   Allergic/Immunologic: Negative for environmental allergies and food allergies.   Neurological: Positive for weakness (legs ( weakness ) ) and numbness (feet ( numbness ) ). Negative for dizziness, syncope, light-headedness and headaches.   Hematological: Does not bruise/bleed easily.   Psychiatric/Behavioral: Positive for sleep disturbance (hard to stay asleep she gets maybe around 4 hrs at night ).       Objective    /91 (BP Location: Left arm, Patient Position: Sitting)   Pulse 87   Temp 96.9 °F (36.1 °C)   Ht 154.9 cm (61\")   Wt 98 kg (216 lb)   SpO2 98%   BMI 40.81 kg/m²   Vitals:    01/10/23 1007   BP: 137/91   BP Location: Left arm   Patient Position: Sitting   Pulse: 87   Temp: 96.9 °F (36.1 °C)   SpO2: 98%   Weight: 98 kg (216 lb)   Height: 154.9 cm (61\")      Lab Results (most recent)     None        Physical Exam  Vitals reviewed.   Constitutional:       General: She is awake.      Appearance: Normal appearance. She is well-developed and well-groomed. She is morbidly obese.   HENT:      Head: Normocephalic.   Eyes:      General: Lids are normal.   Neck:      Vascular: No carotid bruit, hepatojugular reflux or JVD.   Cardiovascular:      Rate and Rhythm: Normal rate and regular rhythm.      Pulses:           Radial pulses are 2+ on the right side and 2+ on the left side.        Dorsalis pedis pulses are 2+ on the right side and 2+ on the left side.        Posterior tibial pulses are 2+ on the right side and 2+ on the left side.      Heart sounds: Normal heart sounds.   Pulmonary:      Effort: Pulmonary effort is normal.      Breath sounds: Examination of the right-middle field reveals decreased breath sounds. Examination of the left-middle field reveals decreased breath sounds. Examination of the right-lower  field reveals decreased breath sounds. Examination of the left-lower field reveals decreased breath sounds. Decreased breath sounds present.   Abdominal:      General: Bowel sounds are normal.      Palpations: Abdomen is soft.   Musculoskeletal:      Right lower leg: No edema.      Left lower leg: No edema.   Skin:     General: Skin is warm and dry.   Neurological:      Mental Status: She is alert and oriented to person, place, and time.   Psychiatric:         Attention and Perception: Attention and perception normal.         Mood and Affect: Mood and affect normal.         Speech: Speech normal.         Behavior: Behavior normal. Behavior is cooperative.         Thought Content: Thought content normal.         Cognition and Memory: Cognition and memory normal.         Judgment: Judgment normal.         Procedure    Procedures        Assessment & Plan      Diagnosis Plan   1. Coronary artery disease involving native coronary artery of native heart without angina pectoris  nitroglycerin (NITROSTAT) 0.4 MG SL tablet      2. Grade I diastolic dysfunction  furosemide (LASIX) 20 MG tablet    potassium chloride 10 MEQ CR tablet      3. Hyperlipidemia, unspecified hyperlipidemia type        4. Palpitations  metoprolol succinate XL (TOPROL-XL) 50 MG 24 hr tablet      5. Shortness of breath        6. Peripheral edema  furosemide (LASIX) 20 MG tablet    potassium chloride 10 MEQ CR tablet      7. Smoking  nicotine (Nicoderm CQ) 21 MG/24HR patch      8. Bilateral carotid artery stenosis        9. Primary hypertension  metoprolol succinate XL (TOPROL-XL) 50 MG 24 hr tablet          Return in about 6 months (around 7/10/2023), or nurse visit next week for VS check and see how nicotine patch works.    CAD-patient is on aspirin, statin and beta.  Diastolic dysfunction/peripheral edema-patient is Lasix and potassium as needed.  Hyperlipidemia-patient's on Lipitor.  Palpitations-patient is increasing her Toprol to 50 mg.  Shortness  of breath-stable.  Smoking-we are giving patient 1 week worth of nicotine patches to see if they help.  Carotid artery disease-patient's on aspirin and statin.  Hypertension-patient will increase her metoprolol 50 mg a day.  She will continue her medication regimen with above-noted changes.  She will come in next week for nurse visit to reassess vital signs and see how she is doing the nicotine patches.  If they do work we will send her in additional patches.  She will follow-up otherwise in 6 months or sooner if any changes.      Kandy Noble  reports that she has been smoking cigarettes. She has a 4.25 pack-year smoking history. She has never used smokeless tobacco.. I have educated her on the risk of diseases from using tobacco products such as cancer, COPD and heart disease.     I advised her to quit and she is not willing to quit.    I spent 3  minutes counseling the patient.         Patient did not bring med list or medicine bottles to appointment, med list has been reviewed and updated based on patient's knowledge of their meds.       Electronically signed by:

## 2023-01-10 NOTE — PROGRESS NOTES
Brynn Marquez Clothier is a 52 y.o. female     Chief Complaint   Patient presents with   • Follow-up       HPI    Problem list:    1.  Nonobstructive CAD  1.1 stress test 9/25/2020-very mild lateral and posterior lateral wall ischemia, post-rest EF 73%  1.2 left heart cath 10/21/2020-minimal irregularities in the circumflex, LAD and RCA, EF 65%, LVEDP 12-14  1.3 stress test 8/30/2022-no evidence of ischemia, post-rest EF 73%  2.  Hyperlipidemia  3.  Palpitations  3.1 event monitor 6/11-6/12/2020-sinus tach  4.  Family history of early coronary artery disease  5.  Shortness of breath  5.1 echo 9/25/2020-EF 60 to 65%, diastolic dysfunction 1, trivial to mild TR, PA in the 20s  5.2 Echo/3/2022-EF 56 to 60%, borderline LVH, diastolic dysfunction 1 trivial MR and trivial to mild TR PA in the low 30s  6.  Borderline diabetes mellitus type 2  7.  Peripheral edema  8.  Smoking habituation  9. History of COVID-19 2020; 12/2021  10.  Carotid artery disease  10.1 carotid artery ultrasound 8/3/2022-16 to 49% stenosis both internal carotid arteries, antegrade flow vertebral arteries  11.  Hypertension    Patient is a 52-year-old female who presents today for a follow-up on testing.  She denies any chest pain or pressure.  She still feels like she has fluttering pretty often.  She notices it more when it sits.  She says she will feel it speed up a little bit and then will get her attention and then will stop.  She denies any dizziness, presyncope, syncope, orthopnea or PND.  She does get swelling in her legs and will have to prop them up on a pillow at night to get them to go down.  Patient says she does have shortness of breath with walking any distance but feels like this really has not changed.  She is fatigued all the time.  Patient says her blood pressure does stay elevated.    We went over stress, carotid and echo.  We are still waiting for labs from PCP.    Current Outpatient Medications on File Prior to Visit    Medication Sig Dispense Refill   • aspirin 81 MG tablet Take 1 tablet by mouth Daily. 30 tablet 11   • atorvastatin (LIPITOR) 20 MG tablet Take 20 mg by mouth Daily.     • gabapentin (NEURONTIN) 100 MG capsule Take 100 mg by mouth 3 (Three) Times a Day.     • HYDROcodone-acetaminophen (NORCO) 7.5-325 MG per tablet Take 1 tablet by mouth 3 (Three) Times a Day.     • metFORMIN (GLUCOPHAGE) 500 MG tablet Take 500 mg by mouth Daily With Breakfast.     • [DISCONTINUED] ezetimibe (ZETIA) 10 MG tablet Take 10 mg by mouth Daily.     • [DISCONTINUED] metoprolol succinate XL (TOPROL-XL) 25 MG 24 hr tablet Take 25 mg by mouth Daily.     • [DISCONTINUED] nitroglycerin (NITROSTAT) 0.4 MG SL tablet 1 under the tongue as needed for angina, may repeat q5mins for up three doses 25 tablet 5     No current facility-administered medications on file prior to visit.       ALLERGIES    Lisinopril    Past Medical History:   Diagnosis Date   • Arthritis    • Asthma    • B12 deficiency    • Back pain    • Bronchitis    • Chronic kidney disease     kidney stones   • COVID     2020 , 12/2021   • COVID-19 vaccine administered     07/17/2021,08/07/2021, 02/05/2022 - Pfizer   • Depression    • Diabetes mellitus (HCC)    • Diverticulosis    • Fatty liver    • Generalized headaches    • GERD (gastroesophageal reflux disease)    • High cholesterol    • Hyperlipidemia    • Hypertension    • Pancreatitis    • Pneumonia    • Wears dentures     UPPER       Social History     Socioeconomic History   • Marital status:    Tobacco Use   • Smoking status: Some Days     Packs/day: 0.25     Years: 17.00     Pack years: 4.25     Types: Cigarettes   • Smokeless tobacco: Never   Vaping Use   • Vaping Use: Every day   • Substances: Nicotine   • Devices: Disposable   Substance and Sexual Activity   • Alcohol use: Not Currently     Comment: social   • Drug use: Never   • Sexual activity: Defer       Family History   Problem Relation Age of Onset   • Liver  disease Father    • Kidney cancer Father    • Heart attack Father    • Hypertension Father    • Hyperlipidemia Father    • Colon cancer Maternal Grandfather    • Diabetes Mother        Review of Systems   Constitutional: Positive for fatigue (fatigued all the time ). Negative for appetite change, chills, diaphoresis and fever.   HENT: Negative for congestion, rhinorrhea and sore throat.    Eyes: Positive for visual disturbance (glasses).   Respiratory: Positive for shortness of breath (walking at any distance; feels like no change ). Negative for cough, chest tightness and wheezing.    Cardiovascular: Positive for palpitations (fluttering; pretty often; she notices it when she is sitting ) and leg swelling (legs, feet and ankles ( swelling will go down at night ) legs , ankles are the worse;  props at night and it helps ). Negative for chest pain.   Gastrointestinal: Positive for nausea (due to meds ). Negative for abdominal pain, blood in stool, constipation, diarrhea and vomiting.   Endocrine: Positive for heat intolerance (Night sweats ).   Genitourinary: Positive for frequency (several times a day and night ), hematuria (2 months ago she is seeing Urology ) and urgency (hard to hold and control at times ). Negative for difficulty urinating and dyspareunia.   Musculoskeletal: Positive for arthralgias (back , left shoulder , knees and legs ), back pain (lower back and upper back ), gait problem (uses a cane ) and joint swelling (legs ). Negative for neck pain and neck stiffness.   Skin: Negative for color change, pallor, rash and wound.   Allergic/Immunologic: Negative for environmental allergies and food allergies.   Neurological: Positive for weakness (legs ( weakness ) ) and numbness (feet ( numbness ) ). Negative for dizziness, syncope, light-headedness and headaches.   Hematological: Does not bruise/bleed easily.   Psychiatric/Behavioral: Positive for sleep disturbance (hard to stay asleep she gets maybe  around 4 hrs at night ).       Objective   /91 (BP Location: Left arm, Patient Position: Sitting)   Pulse 87   Temp 96.9 °F (36.1 °C)   Ht 154.9 cm (61\")   Wt 98 kg (216 lb)   SpO2 98%   BMI 40.81 kg/m²   Vitals:    01/10/23 1007   BP: 137/91   BP Location: Left arm   Patient Position: Sitting   Pulse: 87   Temp: 96.9 °F (36.1 °C)   SpO2: 98%   Weight: 98 kg (216 lb)   Height: 154.9 cm (61\")      Lab Results (most recent)     None        Physical Exam  Vitals reviewed.   Constitutional:       General: She is awake.      Appearance: Normal appearance. She is well-developed and well-groomed. She is morbidly obese.   HENT:      Head: Normocephalic.   Eyes:      General: Lids are normal.   Neck:      Vascular: No carotid bruit, hepatojugular reflux or JVD.   Cardiovascular:      Rate and Rhythm: Normal rate and regular rhythm.      Pulses:           Radial pulses are 2+ on the right side and 2+ on the left side.        Dorsalis pedis pulses are 2+ on the right side and 2+ on the left side.        Posterior tibial pulses are 2+ on the right side and 2+ on the left side.      Heart sounds: Normal heart sounds.   Pulmonary:      Effort: Pulmonary effort is normal.      Breath sounds: Examination of the right-middle field reveals decreased breath sounds. Examination of the left-middle field reveals decreased breath sounds. Examination of the right-lower field reveals decreased breath sounds. Examination of the left-lower field reveals decreased breath sounds. Decreased breath sounds present.   Abdominal:      General: Bowel sounds are normal.      Palpations: Abdomen is soft.   Musculoskeletal:      Right lower leg: No edema.      Left lower leg: No edema.   Skin:     General: Skin is warm and dry.   Neurological:      Mental Status: She is alert and oriented to person, place, and time.   Psychiatric:         Attention and Perception: Attention and perception normal.         Mood and Affect: Mood and affect  normal.         Speech: Speech normal.         Behavior: Behavior normal. Behavior is cooperative.         Thought Content: Thought content normal.         Cognition and Memory: Cognition and memory normal.         Judgment: Judgment normal.         Procedure   Procedures         Assessment & Plan      Diagnosis Plan   1. Coronary artery disease involving native coronary artery of native heart without angina pectoris  nitroglycerin (NITROSTAT) 0.4 MG SL tablet      2. Grade I diastolic dysfunction  furosemide (LASIX) 20 MG tablet    potassium chloride 10 MEQ CR tablet      3. Hyperlipidemia, unspecified hyperlipidemia type        4. Palpitations  metoprolol succinate XL (TOPROL-XL) 50 MG 24 hr tablet      5. Shortness of breath        6. Peripheral edema  furosemide (LASIX) 20 MG tablet    potassium chloride 10 MEQ CR tablet      7. Smoking  nicotine (Nicoderm CQ) 21 MG/24HR patch      8. Bilateral carotid artery stenosis        9. Primary hypertension  metoprolol succinate XL (TOPROL-XL) 50 MG 24 hr tablet          Return in about 6 months (around 7/10/2023), or nurse visit next week for VS check and see how nicotine patch works.    CAD-patient is on aspirin, statin and beta.  Diastolic dysfunction/peripheral edema-patient is Lasix and potassium as needed.  Hyperlipidemia-patient's on Lipitor.  Palpitations-patient is increasing her Toprol to 50 mg.  Shortness of breath-stable.  Smoking-we are giving patient 1 week worth of nicotine patches to see if they help.  Carotid artery disease-patient's on aspirin and statin.  Hypertension-patient will increase her metoprolol 50 mg a day.  She will continue her medication regimen with above-noted changes.  She will come in next week for nurse visit to reassess vital signs and see how she is doing the nicotine patches.  If they do work we will send her in additional patches.  She will follow-up otherwise in 6 months or sooner if any changes.       [facility] Cl[facility]ier   reports that she has been smoking cigarettes. She has a 4.25 pack-year smoking history. She has never used smokeless tobacco.. I have educated her on the risk of diseases from using tobacco products such as cancer, COPD and heart disease.     I advised her to quit and she is not willing to quit.    I spent 3  minutes counseling the patient.         Patient did not bring med list or medicine bottles to appointment, med list has been reviewed and updated based on patient's knowledge of their meds.       Electronically signed by:

## 2023-01-11 ENCOUNTER — TELEPHONE (OUTPATIENT)
Dept: CARDIOLOGY | Facility: CLINIC | Age: 53
End: 2023-01-11
Payer: COMMERCIAL

## 2023-01-18 ENCOUNTER — TELEPHONE (OUTPATIENT)
Dept: CARDIOLOGY | Facility: CLINIC | Age: 53
End: 2023-01-18
Payer: COMMERCIAL

## 2023-01-18 NOTE — TELEPHONE ENCOUNTER
Called and left pt a mess due to we are having a staff meeting tomorrow at 1 pm . Left mess if pt would like to come in at 11 am tomorrow or if she would rather come in after 1 pm tomorrow the patient will be sent a My Chart mess has well    Ashley Melvin , A

## 2023-01-24 ENCOUNTER — TELEPHONE (OUTPATIENT)
Dept: CARDIOLOGY | Facility: CLINIC | Age: 53
End: 2023-01-24
Payer: COMMERCIAL

## 2023-01-24 DIAGNOSIS — I10 PRIMARY HYPERTENSION: ICD-10-CM

## 2023-01-24 DIAGNOSIS — R00.2 PALPITATIONS: Primary | ICD-10-CM

## 2023-01-24 DIAGNOSIS — R42 DIZZINESS: ICD-10-CM

## 2023-01-24 RX ORDER — LOSARTAN POTASSIUM 50 MG/1
50 TABLET ORAL DAILY
Qty: 30 TABLET | Refills: 11 | Status: SHIPPED | OUTPATIENT
Start: 2023-01-24 | End: 2023-02-09 | Stop reason: SDUPTHER

## 2023-01-24 RX ORDER — NEBIVOLOL 5 MG/1
5 TABLET ORAL DAILY
Qty: 30 TABLET | Refills: 11 | Status: SHIPPED | OUTPATIENT
Start: 2023-01-24 | End: 2023-02-09 | Stop reason: SDUPTHER

## 2023-01-24 NOTE — TELEPHONE ENCOUNTER
Caller: Kandy Noble [place]     Best call back number: [phone]    What is your medical concern? PATIENT REPORTS AFTER TAKING NIGHT TIME CHOLESTEROL MEDICATION HER HEART WILL START RACING AS SHE IS LYING DOWN. SAID BLOOD PRESSURE /106, HEART RATE 87. THIS MORNING BLOOD PRESSURE WAS BACK TO NORMAL, 140/87.     How long has this issue been going on? PAST 3 NIGHTS     Is your provider already aware of this issue? YES    Have you been treated for this issue? TAKING METOPROLOL

## 2023-01-24 NOTE — TELEPHONE ENCOUNTER
Patient informed and will monitor BP/HR. She will stop by office tomorrow for monitor donning.     Edd Sierra PA Cheek, Laura Anne, MA  Caller: Unspecified (Today,  8:43 AM)  We can try something else if she doesn't feel metoprolol is working. Swithc to bystolic 5mg once daily and losartan 50mg. She will need more than one to keep her bp down.       Patient has episodes with heart racing, flutters and dizziness mostly at night but episodes also during the day. BP is usually elevated when she checks it, 188/106. She feels Metoprolol 50mg may be wearing off and not necessarily the Lipitor. She has cut out all caffeine. She was not able to wear monitor due to not having wi-fi connection and request a Holter monitor.

## 2023-01-31 ENCOUNTER — TELEPHONE (OUTPATIENT)
Dept: CARDIOLOGY | Facility: CLINIC | Age: 53
End: 2023-01-31
Payer: COMMERCIAL

## 2023-01-31 NOTE — TELEPHONE ENCOUNTER
Caller: Kandy Noble [place]    Relationship: Self    Best call back number: [phone]     What is the best time to reach you: ANY    Who are you requesting to speak with (clinical staff, provider,  specific staff member): CLINICAL STAFF    Do you know the name of the person who called: PT    What was the call regarding: PT WAS WONDERING IF THESE MEDS LOSARTAN POTASSIUM 50 MG AND NEBIVOLOL 5 MG CAN BE TAKEN AT THE SAME TIME OR IF THEY NEED TO BE SPACED OUT AND SHOULD SHE STILL B TAKING ASPRIN DAILY-PT CAME INTO OFFICE AND GOT HEART MONITOR-TOOK OFF AFTER 3 DAYS DUE TO BEING ALLERGIC TO SOMETHING-DO YOU WANT HER TO TRY AGAIN OR GO AHEAD AND SEND BACK-PLEASE CALL PT AND ADVISE    Do you require a callback: YES

## 2023-01-31 NOTE — TELEPHONE ENCOUNTER
I called pt, explained that she can take her meds at whatever time of day is easiest for her. Does need to take ASA daily.     I advised her to come in and  some sensitive strips to try, explained that we would like to try and assist her with finishing monitor if possible. She verbalized understanding.

## 2023-02-06 ENCOUNTER — TELEPHONE (OUTPATIENT)
Dept: CARDIOLOGY | Facility: CLINIC | Age: 53
End: 2023-02-06
Payer: COMMERCIAL

## 2023-02-06 DIAGNOSIS — F17.200 SMOKING: ICD-10-CM

## 2023-02-06 RX ORDER — NICOTINE 21 MG/24HR
PATCH, TRANSDERMAL 24 HOURS TRANSDERMAL
Qty: 7 PATCH | Refills: 2 | Status: SHIPPED | OUTPATIENT
Start: 2023-02-06 | End: 2023-02-22 | Stop reason: ALTCHOICE

## 2023-02-06 NOTE — TELEPHONE ENCOUNTER
Caller: Kandy Noble Alma    Relationship: Self    Best call back number: [phone]    What is the best time to reach you: ANYTIME    Who are you requesting to speak with (clinical staff, provider,  specific staff member): CLINICAL STAFF    Do you know the name of the person who called: KANDY NOBLE    What was the call regarding: PT IS TRYING TO ADJUST HER DIET TO A HEART HEALTHY DIET. SHE WOULD LIKE TO KNOW HOW CAREFULLY TO LOOK AT CHOLESTEROL, FAT, AND SODIUM IN FOODS. SHE IS TRYING TO ADJUST HER GROCERY SHOPPING, BUT IS UNSURE WHICH IS MOST IMPORTANT WHEN SHOPPING. SHE WOULD LIKE A CALL BACK WITH ADVICE.    Do you require a callback: YES

## 2023-02-06 NOTE — TELEPHONE ENCOUNTER
I spoke w/ Isela, she stated we can send pt info on cardiac diet. I can mail it to her or try to send it to her via [email or web address].       I called and informed pt of the above, she told me I could send it to her via [email or web address].

## 2023-02-08 ENCOUNTER — TELEPHONE (OUTPATIENT)
Dept: CARDIOLOGY | Facility: CLINIC | Age: 53
End: 2023-02-08
Payer: COMMERCIAL

## 2023-02-08 NOTE — TELEPHONE ENCOUNTER
Pt states she takes the Bystolic in the am and the Losartan and Metformin about 2 hours later. States she takes meds at 8am and 10am.   Pt states she has not been regularly monitoring her BP, because she's been feeling better. States she's only aware of that one BP. States she is getting tired easily.     I advised pt to check her BP prior to taking her meds then call us in the am.

## 2023-02-08 NOTE — TELEPHONE ENCOUNTER
Isela Miguel, Coco Campos RegSched Rep  Cc: Heike Mariscal  Caller: Unspecified (Today,  1:45 PM)  Is she taking the bystolic and losartan at the same time?  Is that the only low blood pressure she has had?  Has she been monitoring her BP?  Have her check her BP before she takes her next dose of BP meds, if in AM have her call us tomorrow with BP readings prior to taking meds so we can determine what adjustments to make or if she needs to continue to hold.

## 2023-02-08 NOTE — TELEPHONE ENCOUNTER
"Pt called the office to report that the new meds are improving her B/P however, today she is at Dr. Spencer's office and her B/P is 88/64 and her HR is 76 bpm.  She reports she is eating much healthier (Heart Healthy foods) and stopped smoking 7 days ago.  Pt reports she feels lightheaded for a couple days, \"little, not horrific.\" She reports she feels better since starting the new medication, no headaches or dizzy.    "

## 2023-02-09 ENCOUNTER — TELEPHONE (OUTPATIENT)
Dept: CARDIOLOGY | Facility: CLINIC | Age: 53
End: 2023-02-09
Payer: COMMERCIAL

## 2023-02-09 RX ORDER — LOSARTAN POTASSIUM 50 MG/1
50 TABLET ORAL AS NEEDED
Qty: 30 TABLET | Refills: 11
Start: 2023-02-09 | End: 2023-02-22 | Stop reason: SDUPTHER

## 2023-02-09 RX ORDER — NEBIVOLOL 5 MG/1
5 TABLET ORAL DAILY PRN
Qty: 30 TABLET | Refills: 11
Start: 2023-02-09 | End: 2023-02-22 | Stop reason: SDUPTHER

## 2023-02-09 NOTE — TELEPHONE ENCOUNTER
Isela Miguel APRN Sawyers, Emily  Caller: Unspecified (Today,  8:17 AM)  Continue to hold bystolic and losartan.  If systolic blood pressure gets over 130 take the bystolic only.  If systolic BP over 140 take losartan and bystolic, one in AM and one in PM.  Call next week with updated blood pressure readings.       Thanks.

## 2023-02-09 NOTE — TELEPHONE ENCOUNTER
I called and explained Isela's instructions to pt, she verbalized understanding.   (I updated rx in chart, but did not resent to px)

## 2023-02-14 ENCOUNTER — TELEPHONE (OUTPATIENT)
Dept: CARDIOLOGY | Facility: CLINIC | Age: 53
End: 2023-02-14
Payer: COMMERCIAL

## 2023-02-17 ENCOUNTER — TELEPHONE (OUTPATIENT)
Dept: CARDIOLOGY | Facility: CLINIC | Age: 53
End: 2023-02-17
Payer: COMMERCIAL

## 2023-02-17 NOTE — TELEPHONE ENCOUNTER
For the HUB to read to pt:         Isela reviewed patients CT Abdomen.    Per Isela:    Patient does not need seen sooner with her since her primary care doctor is already getting her established with Dr. Al.     I tried contacting patient to make her aware of this but I had to leave a message. If patient calls back please read her the above message. Thank you

## 2023-02-22 ENCOUNTER — TELEPHONE (OUTPATIENT)
Dept: CARDIOLOGY | Facility: CLINIC | Age: 53
End: 2023-02-22
Payer: COMMERCIAL

## 2023-02-22 DIAGNOSIS — F17.200 SMOKING: ICD-10-CM

## 2023-02-22 RX ORDER — LOSARTAN POTASSIUM 50 MG/1
50 TABLET ORAL AS NEEDED
Qty: 30 TABLET | Refills: 11
Start: 2023-02-22 | End: 2023-02-24 | Stop reason: SDUPTHER

## 2023-02-22 RX ORDER — NICOTINE 21 MG/24HR
1 PATCH, TRANSDERMAL 24 HOURS TRANSDERMAL EVERY 24 HOURS
Qty: 30 PATCH | Refills: 11 | Status: SHIPPED | OUTPATIENT
Start: 2023-02-22 | End: 2023-04-04

## 2023-02-22 RX ORDER — NEBIVOLOL 5 MG/1
5 TABLET ORAL DAILY PRN
Qty: 30 TABLET | Refills: 11
Start: 2023-02-22 | End: 2023-02-24 | Stop reason: SDUPTHER

## 2023-02-22 NOTE — TELEPHONE ENCOUNTER
Isela Miguel, Heike Baltazar  Caller: Unspecified (Today,  1:02 PM)  You can send in the 14 mg patches and d/c the 21 thanks

## 2023-02-22 NOTE — TELEPHONE ENCOUNTER
Caller: Kandy Noble Alma    Relationship: Self    Best call back number: [phone]  What medications are you currently taking:   Current Outpatient Medications on File Prior to Visit   Medication Sig Dispense Refill   • aspirin 81 MG tablet Take 1 tablet by mouth Daily. 30 tablet 11   • atorvastatin (LIPITOR) 20 MG tablet Take 20 mg by mouth Daily.     • furosemide (LASIX) 20 MG tablet Take 1 tablet by mouth Daily As Needed (swelling). 90 tablet 3   • gabapentin (NEURONTIN) 100 MG capsule Take 100 mg by mouth 3 (Three) Times a Day.     • HYDROcodone-acetaminophen (NORCO) 7.5-325 MG per tablet Take 1 tablet by mouth 3 (Three) Times a Day.     • losartan (Cozaar) 50 MG tablet Take 1 tablet by mouth As Needed (If Systolic is above 140, take Losartan and Bystolic, 1 in am and 1 in pm). 30 tablet 11   • metFORMIN (GLUCOPHAGE) 500 MG tablet Take 500 mg by mouth Daily With Breakfast.     • nebivolol (Bystolic) 5 MG tablet Take 1 tablet by mouth Daily As Needed (If Systolic is above 130). 30 tablet 11   • nicotine (NICODERM CQ) 21 MG/24HR patch APPLY ONE PATCH TO THE SKIN DAILY AS DIRECTED BY PROVIDER 7 patch 2   • nitroglycerin (NITROSTAT) 0.4 MG SL tablet 1 under the tongue as needed for angina, may repeat q5mins for up three doses 45 tablet 2   • potassium chloride 10 MEQ CR tablet Take 1 tablet by mouth Daily As Needed (with lasix). 90 tablet 3   • [DISCONTINUED] losartan (Cozaar) 50 MG tablet Take 1 tablet by mouth As Needed (If Systolic is above 140, take Losartan and Bystolic, 1 in am and 1 in pm). 30 tablet 11   • [DISCONTINUED] nebivolol (Bystolic) 5 MG tablet Take 1 tablet by mouth Daily As Needed (If Systolic is above 130). 30 tablet 11     No current facility-administered medications on file prior to visit.          When did you start taking these medications:  FEW WEEKS    Which medication are you concerned about: NICOTINE    Who prescribed you this medication: SUSANNE PENA    What are your  concerns: 21. MG IS A LITTLE TO STRONG FOR THE PATIENT. IT'S MAKING THE PATIENT NAUSEOUS.  .    PATIENT WOULD LIKE MOVE DOWN TO THE 14 MG. PLEASE ADVICE        How long have you had these concerns: LAST 2 DAYS.

## 2023-02-22 NOTE — TELEPHONE ENCOUNTER
Caller: Kandy Noble Alma    Relationship: Self    Best call back number: [phone]    Requested Prescriptions:   Requested Prescriptions     Pending Prescriptions Disp Refills   • losartan (Cozaar) 50 MG tablet 30 tablet 11     Sig: Take 1 tablet by mouth As Needed (If Systolic is above 140, take Losartan and Bystolic, 1 in am and 1 in pm).   • nebivolol (Bystolic) 5 MG tablet 30 tablet 11     Sig: Take 1 tablet by mouth Daily As Needed (If Systolic is above 130).        Pharmacy where request should be sent: [facility] [id number] [phone] [facility] [phone]      Additional details provided by patient: PATIENT WOULD LIKE TO HAVE THIS SENT AS A 90 DAY SUPPLY. PATIENT HAS APPROXIMATELY 3 DAYS WORTH OF SOME OF THESE MEDICATIONS.     Does the patient have less than a 3 day supply:  [x] Yes  [] No    Edelmira Gan Rep   02/22/23 09:06 EST

## 2023-02-24 ENCOUNTER — TELEPHONE (OUTPATIENT)
Dept: CARDIOLOGY | Facility: CLINIC | Age: 53
End: 2023-02-24
Payer: COMMERCIAL

## 2023-02-24 RX ORDER — NEBIVOLOL 5 MG/1
5 TABLET ORAL DAILY PRN
Qty: 90 TABLET | Refills: 3 | Status: SHIPPED | OUTPATIENT
Start: 2023-02-24

## 2023-02-24 RX ORDER — LOSARTAN POTASSIUM 50 MG/1
50 TABLET ORAL AS NEEDED
Qty: 90 TABLET | Refills: 3 | Status: SHIPPED | OUTPATIENT
Start: 2023-02-24

## 2023-02-24 NOTE — TELEPHONE ENCOUNTER
HOLTER MONITOR RESULTS BRIEFLY DISCUSSED. PH,LPN        ----- Message from Edelmira Cooper sent at 2/23/2023  5:23 PM EST -----      ----- Message -----  From: Edd Sierra PA  Sent: 2/20/2023  11:53 AM EST  To: YAMILA Banuelos    Routine follow-up

## 2023-02-28 ENCOUNTER — TELEPHONE (OUTPATIENT)
Dept: CARDIOLOGY | Facility: CLINIC | Age: 53
End: 2023-02-28
Payer: COMMERCIAL

## 2023-02-28 NOTE — TELEPHONE ENCOUNTER
HOLTER  Pt notified of no acute findings. Provider will discuss results at f/u. Pt reminded of appt date and time.  ----- Message from DANELLE Vizcarra sent at 2/20/2023 11:53 AM EST -----  Routine follow-up

## 2023-03-16 ENCOUNTER — TELEPHONE (OUTPATIENT)
Dept: CARDIOLOGY | Facility: CLINIC | Age: 53
End: 2023-03-16
Payer: COMMERCIAL

## 2023-03-16 NOTE — TELEPHONE ENCOUNTER
Caller: Kandy Noble Alma    Relationship: Self    Best call back number: [phone]  What is the best time to reach you: ANYTIME AND OK TO LEAVE VM    Who are you requesting to speak with (clinical staff, provider,  specific staff member):CLINICAL        What was the call regarding: PATIENT IS CALLING INQUIRING ON HOW MUCH SATURATED AND TRANS FAT SHE SHOULD HAVE DAILY AND ALSO CHOLESTROL .       Do you require a callback: YES

## 2023-03-20 ENCOUNTER — TELEPHONE (OUTPATIENT)
Dept: CARDIOLOGY | Facility: CLINIC | Age: 53
End: 2023-03-20
Payer: COMMERCIAL

## 2023-03-20 NOTE — TELEPHONE ENCOUNTER
Patient has changed diet and quit smoking in February since then BP has been running 113/70. She has been experiencing some dizziness and wants to try half tablet of Bystolic.   Ok per Salo Daniels PA-C to spit tablet to see if that helps with symptoms. She will monitor BP/HR and call with readings or concerns.

## 2023-03-20 NOTE — TELEPHONE ENCOUNTER
Caller: Kandy Noble Alma    Relationship: Self    Best call back number: [phone]    What is the best time to reach you: ANYTIME     Who are you requesting to speak with (clinical staff, provider,  specific staff member): ANYONE     What was the call regarding: PATIENT WOULD LIKE TO DISCUSS CUTTING HER NEBIVOLOL 5MG IN HALF TO LEVEL OUT HER BLOOD PRESSURE.     Do you require a callback: YES

## 2023-03-21 ENCOUNTER — TELEPHONE (OUTPATIENT)
Dept: CARDIOLOGY | Facility: CLINIC | Age: 53
End: 2023-03-21
Payer: COMMERCIAL

## 2023-03-21 ENCOUNTER — TELEPHONE (OUTPATIENT)
Dept: CARDIAC SURGERY | Facility: CLINIC | Age: 53
End: 2023-03-21
Payer: COMMERCIAL

## 2023-03-21 NOTE — TELEPHONE ENCOUNTER
Called patient, test ordered by Dr Antonio and results given to patient by urologist and PCP. Follow up scheduled with Dr Al, referred by PCP for evaluation and treatment. She will continue aspirin and statin therapy.

## 2023-03-21 NOTE — TELEPHONE ENCOUNTER
Caller: Kandy Noble Alma    Relationship: Self    Best call back number: [phone]    What is the best time to reach you: ANYTIME     What was the call regarding: PATIENT IS CALLING IN WITH CONCERNS ABOUT HER CT SCAN SHE HAD ON 01/27/23, IT WAS SENT OVER AND NO ONE EVER CALLED IN BACK WITH RESULTS. THIS WAS DONE AT [facility]. PATIENT STATED THAT THE UROLOGIST AND THE FAMILY  MADE HER BELIEVE THAT THERE ARE MAJOR CONCERNS ABOUT HER ARTERIES. WOULD LIKE A CALL BACK TO DISCUSS THIS AND SEE IF SHE NEEDS TO GET I SOONER THAN July APPOINTMENT. PATIENT STATED SHE DOES HAVE APPOINTMENT WITH [facility] IN [place] FOR THIS BUT IS SCARED AND WOULD LIKE TO BE ADVISED ON THIS SOONER.     Do you require a callback: YES

## 2023-03-21 NOTE — TELEPHONE ENCOUNTER
CARDIAC CLEARANCE RECEIVED THIS DATE, SCANNED, AND PLACED IN CLINICAL STAFF'S MAILBOX.    [facility]  TOTAL KNEE REPLACEMENT  10/11/2021   Last refill until visit   Prescription approved per [facility] Refill Protocol.  Scarlett Alvarez RN, BSN  [facility]

## 2023-03-21 NOTE — TELEPHONE ENCOUNTER
Patient sent cardiac diet recommendations through [employer]. She will contact PCP for dietician referral.

## 2023-04-04 ENCOUNTER — TELEPHONE (OUTPATIENT)
Dept: CARDIOLOGY | Facility: CLINIC | Age: 53
End: 2023-04-04
Payer: COMMERCIAL

## 2023-04-04 NOTE — TELEPHONE ENCOUNTER
Caller: Kandy Noble Alma    Relationship: Self    Best call back number: [phone]    What is the best time to reach you: ANY    Who are you requesting to speak with (clinical staff, provider,  specific staff member): ANY      What was the call regarding:PT WOULD LIKE TO KNOW IF SHE CAN GET THE  7MG NICOTINE PATCH INSTEAD OF THE 14MG. SHE WOULD LIKE TO HAVE THAT SENT TO THE [facility] IN [place].    Do you require a callback: YES

## 2023-04-18 ENCOUNTER — OFFICE VISIT (OUTPATIENT)
Dept: CARDIAC SURGERY | Facility: CLINIC | Age: 53
End: 2023-04-18
Payer: COMMERCIAL

## 2023-04-18 VITALS
BODY MASS INDEX: 37.78 KG/M2 | OXYGEN SATURATION: 97 % | SYSTOLIC BLOOD PRESSURE: 112 MMHG | HEART RATE: 94 BPM | WEIGHT: 200.1 LBS | DIASTOLIC BLOOD PRESSURE: 66 MMHG | TEMPERATURE: 98.6 F | HEIGHT: 61 IN

## 2023-04-18 DIAGNOSIS — I70.219 ATHEROSCLEROTIC PVD WITH INTERMITTENT CLAUDICATION: Primary | ICD-10-CM

## 2023-04-18 DIAGNOSIS — I73.9 PVD (PERIPHERAL VASCULAR DISEASE) WITH CLAUDICATION: Primary | ICD-10-CM

## 2023-04-18 PROCEDURE — 1159F MED LIST DOCD IN RCRD: CPT | Performed by: THORACIC SURGERY (CARDIOTHORACIC VASCULAR SURGERY)

## 2023-04-18 PROCEDURE — 99203 OFFICE O/P NEW LOW 30 MIN: CPT | Performed by: THORACIC SURGERY (CARDIOTHORACIC VASCULAR SURGERY)

## 2023-04-18 PROCEDURE — 1160F RVW MEDS BY RX/DR IN RCRD: CPT | Performed by: THORACIC SURGERY (CARDIOTHORACIC VASCULAR SURGERY)

## 2023-04-18 RX ORDER — CILOSTAZOL 100 MG/1
100 TABLET ORAL 2 TIMES DAILY
Qty: 60 TABLET | Refills: 11 | Status: SHIPPED | OUTPATIENT
Start: 2023-04-18

## 2023-04-18 NOTE — PROGRESS NOTES
04/18/2023  Patient Information  Kandy [facility] [employer]                                                                                          [id number]  [place] [address]   1970  'PCP/Referring Physician'  Oriana More APRN  [phone]  Oriana More APRN  [phone]  Chief Complaint   Patient presents with   • Consult     New pt per Oriana TRONCOSO for PVD- Pt states that this problem was found by accidentally findings, here today for a consult. Pt states that she SOB, has palpitations of the heart and  increased blood pressure. Bilateral swelling in both feet with discoloration after standing for a while.        History of Present Illness: 52-year-old  female with history of hypertension, hyperlipidemia, diabetes mellitus and previous tobacco abuse who presents with bilateral lower extremity pain.  Over the past 6 months, the patient notes right greater than left lower extremity pain that is present in the calves.  This pain is present both at rest and exacerbated with ambulation.  She does have some swelling in the lower extremities and normally uses a cane with ambulation.  Overall, the patient notes she is struggling with mobility.       Patient Active Problem List   Diagnosis   • Epigastric pain   • Nausea   • Heartburn   • Diarrhea   • Left lower quadrant pain   • Abnormal CT of liver   • Morbidly obese   • Other chest pain   • Palpitations   • Shortness of breath   • Asthma   • Borderline type 2 diabetes mellitus   • Hyperlipidemia   • Family history of early CAD   • Lightheadedness   • Peripheral edema   • Smoking   • Grade I diastolic dysfunction   • Coronary artery disease involving native coronary artery of native heart without angina pectoris     Past Medical History:   Diagnosis Date   • Arthritis    • Asthma    • B12 deficiency    • Back pain    • Bronchitis    • Chronic kidney disease     kidney stones   • COVID     2020 , 12/2021   • COVID-19 vaccine administered      07/17/2021,08/07/2021, 02/05/2022 - Pfizer   • Depression    • Diabetes mellitus    • Diverticulosis    • Fatty liver    • Generalized headaches    • GERD (gastroesophageal reflux disease)    • High cholesterol    • Hyperlipidemia    • Hypertension    • Pancreatitis    • Pneumonia    • Wears dentures     UPPER     Past Surgical History:   Procedure Laterality Date   • APPENDECTOMY     • CARDIAC CATHETERIZATION      no stents placed   • CHOLECYSTECTOMY     • CHOLECYSTECTOMY N/A    • DIAGNOSTIC LAPAROSCOPY     • HYSTERECTOMY  2017   • KIDNEY STONE SURGERY     • REPLACEMENT TOTAL KNEE      left   • SALPINGO OOPHORECTOMY Left    • SHOULDER SURGERY     • UPPER GASTROINTESTINAL ENDOSCOPY  27 years ago       Current Outpatient Medications:   •  aspirin 81 MG tablet, Take 1 tablet by mouth Daily., Disp: 30 tablet, Rfl: 11  •  atorvastatin (LIPITOR) 20 MG tablet, Take 1 tablet by mouth Daily., Disp: , Rfl:   •  gabapentin (NEURONTIN) 100 MG capsule, Take 1 capsule by mouth 3 (Three) Times a Day., Disp: , Rfl:   •  HYDROcodone-acetaminophen (NORCO) 7.5-325 MG per tablet, Take 1 tablet by mouth 3 (Three) Times a Day., Disp: , Rfl:   •  losartan (Cozaar) 50 MG tablet, Take 1 tablet by mouth As Needed (If Systolic is above 140, take Losartan and Bystolic, 1 in am and 1 in pm)., Disp: 90 tablet, Rfl: 3  •  metFORMIN (GLUCOPHAGE) 500 MG tablet, Take 250 mg by mouth Daily With Breakfast. 250, Disp: , Rfl:   •  nebivolol (Bystolic) 5 MG tablet, Take 1 tablet by mouth Daily As Needed (If Systolic is above 130)., Disp: 90 tablet, Rfl: 3  •  nicotine (NICODERM CQ) 7 MG/24HR patch, Place 1 patch on the skin as directed by provider Daily., Disp: 30 patch, Rfl: 6  •  nitroglycerin (NITROSTAT) 0.4 MG SL tablet, 1 under the tongue as needed for angina, may repeat q5mins for up three doses, Disp: 45 tablet, Rfl: 2  •  furosemide (LASIX) 20 MG tablet, Take 1 tablet by mouth Daily As Needed (swelling). (Patient not taking: Reported on  2023), Disp: 90 tablet, Rfl: 3  •  potassium chloride 10 MEQ CR tablet, Take 1 tablet by mouth Daily As Needed (with lasix). (Patient not taking: Reported on 2023), Disp: 90 tablet, Rfl: 3  Allergies   Allergen Reactions   • Lisinopril Cough     Social History     Socioeconomic History   • Marital status:    Tobacco Use   • Smoking status: Former     Packs/day: 0.25     Years: 17.00     Pack years: 4.25     Types: Cigarettes     Quit date: 2023     Years since quittin.2   • Smokeless tobacco: Never   • Tobacco comments:     Pt states that she quit smoking 2023   Vaping Use   • Vaping Use: Former   • Quit date: 2/10/2023   Substance and Sexual Activity   • Alcohol use: Not Currently     Comment: social   • Drug use: Never   • Sexual activity: Defer     Family History   Problem Relation Age of Onset   • Diabetes Mother    • Liver disease Father    • Kidney cancer Father    • Heart attack Father    • Hypertension Father    • Hyperlipidemia Father    • Colon cancer Maternal Grandfather      Review of Systems   Constitutional: Positive for malaise/fatigue. Negative for chills, decreased appetite, fever, weight gain and weight loss.   HENT: Positive for congestion. Negative for hearing loss.         Pt states that she has a hard time swallowing for the last couple of months   Cardiovascular: Positive for chest pain (pt states heaviness and weighted feelings) and dyspnea on exertion. Negative for claudication, cyanosis, irregular heartbeat, leg swelling, near-syncope, orthopnea, palpitations, paroxysmal nocturnal dyspnea and syncope.   Endocrine: Negative for polydipsia, polyphagia and polyuria.   Hematologic/Lymphatic: Negative for adenopathy. Does not bruise/bleed easily.   Skin: Positive for poor wound healing.   Musculoskeletal: Positive for arthritis, back pain, gout, joint swelling, muscle cramps and muscle weakness. Negative for falls, joint pain and myalgias.   Gastrointestinal:  "Negative for abdominal pain, anorexia, dysphagia, heartburn, nausea and vomiting.   Genitourinary: Positive for nocturia. Negative for dysuria and hematuria.   Neurological: Negative for dizziness, focal weakness, headaches, loss of balance, numbness, seizures, vertigo and weakness.   Psychiatric/Behavioral: Negative for altered mental status, depression, substance abuse and suicidal ideas. The patient has insomnia and is nervous/anxious.    Allergic/Immunologic: Positive for environmental allergies. Negative for HIV exposure and persistent infections.     Vitals:    04/18/23 0959   BP: 112/66   Pulse: 94   Temp: 98.6 °F (37 °C)   SpO2: 97%   Weight: 90.8 kg (200 lb 1.6 oz)   Height: 154.9 cm (61\")      Physical Exam  Vitals reviewed.   Constitutional:       General: She is not in acute distress.     Comments: Pleasant  female who appears stated age   HENT:      Head: Normocephalic and atraumatic.      Nose: Nose normal.   Eyes:      General: No scleral icterus.  Cardiovascular:      Rate and Rhythm: Normal rate and regular rhythm.      Pulses:           Femoral pulses are 0 on the right side and 0 on the left side.       Popliteal pulses are 0 on the right side and 0 on the left side.        Dorsalis pedis pulses are detected w/ Doppler on the right side and detected w/ Doppler on the left side.        Posterior tibial pulses are detected w/ Doppler on the right side and detected w/ Doppler on the left side.      Heart sounds: No murmur heard.    No friction rub. No gallop.      Comments: Bilateral dorsalis pedis and posterior tibial Doppler signals are present.  Pulmonary:      Effort: Pulmonary effort is normal. No respiratory distress.      Breath sounds: Normal breath sounds.   Abdominal:      General: There is no distension.      Palpations: Abdomen is soft. There is no mass.      Tenderness: There is no abdominal tenderness. There is no guarding or rebound.   Musculoskeletal:         General: Normal " range of motion.      Cervical back: Neck supple.      Right lower leg: No edema.      Left lower leg: No edema.   Skin:     General: Skin is warm and dry.      Comments: No pedal erythema or ulcerations   Neurological:      General: No focal deficit present.      Mental Status: She is alert and oriented to person, place, and time.      Comments: Intact pedal motor function and sensation   Psychiatric:         Mood and Affect: Mood normal.         Behavior: Behavior normal.         Thought Content: Thought content normal.         Judgment: Judgment normal.         The ROS, past medical history, surgical history, family history, social history and vitals were reviewed by myself and corrected as needed.      Labs/Imaging:  -CT of the abdomen and pelvis with and without contrast performed 1/27/2023, personally reviewed, demonstrates limited examination with contrast timing.  There is scattered extensive atherosclerosis with calcific changes in the aorta and iliac vessels.  A 4 mm noncalcified nodule is present in the left lung base.    Assessment/Plan:  52-year-old  female with history of hypertension, hyperlipidemia, diabetes mellitus and previous tobacco abuse who presents with bilateral lower extremity pain.  The patient has peripheral vascular disease and lower extremity claudication/ischemic rest pain.  She will be given a prescription for Pletal for her lower extremity claudication.  We discussed the importance of a structured walking program for her lower extremity claudication.  Thankfully she quit smoking two months ago.  A CTA of the aorta with lower extremity runoff will be obtained to better evaluate her vasculature.  The patient will return to clinic in 1 month to discuss these results and evaluate her progress with medical management.    Patient Active Problem List   Diagnosis   • Epigastric pain   • Nausea   • Heartburn   • Diarrhea   • Left lower quadrant pain   • Abnormal CT of liver   •  Morbidly obese   • Other chest pain   • Palpitations   • Shortness of breath   • Asthma   • Borderline type 2 diabetes mellitus   • Hyperlipidemia   • Family history of early CAD   • Lightheadedness   • Peripheral edema   • Smoking   • Grade I diastolic dysfunction   • Coronary artery disease involving native coronary artery of native heart without angina pectoris

## 2023-05-01 ENCOUNTER — TELEPHONE (OUTPATIENT)
Dept: CARDIOLOGY | Facility: CLINIC | Age: 53
End: 2023-05-01
Payer: COMMERCIAL

## 2023-05-01 ENCOUNTER — TELEPHONE (OUTPATIENT)
Dept: CARDIOLOGY | Facility: CLINIC | Age: 53
End: 2023-05-01

## 2023-05-01 NOTE — TELEPHONE ENCOUNTER
Caller: Kandy Noble Lama    Relationship to patient: Self    Best call back number: [phone]    Patient is needing: TO SPEAK WITH SOMEONE CLINICAL REGARDING BLOOD PRESSURE- CURRENTLY 154/97 PULSE WAS 69- STATES SHOULDERS ARE KILLING HER AND  THERE IS PAIN IN THE MIDDLE OF HER BACK- TOOK 1 LOSARTAN TODAY AFTER 2 MONTHS OF NOT TAKING MEDICATION

## 2023-05-01 NOTE — TELEPHONE ENCOUNTER
Patient aware of recommendations.     Patient reports no longer experiencing back and shoulder pain but does have a dull ache in her chest. She has been taking Bystolic daily. She took Losartan 50 mg yesterday and the lowest BP since yesterday 141/90. She wants to see if she can take an extra Losartan. She was advised to proceed to the ER with any worsening symptoms or active chest pain.

## 2023-05-01 NOTE — TELEPHONE ENCOUNTER
Caller: Kandy Noble    Relationship to patient: Self    Best call back number: [phone]    Chief complaint: WANTS A SOONER APPT, FORMER PT OF [facility]    Type of visit: F/U    Requested date: ASAP    If rescheduling, when is the original appointment: 07.20.23     Additional notes: PT WAS A FORMER PT OF SUSAN[facility], WAS ABLE TO GET IN TO SEE BO ON 07.20.23. SHE IS JUST CONCERNED WITH HER CURRENT HEALTH STATE AND WOULD LIKE TO GET IN A LOT SOONER THAN LATE July.

## 2023-05-03 ENCOUNTER — HOSPITAL ENCOUNTER (OUTPATIENT)
Dept: CT IMAGING | Facility: HOSPITAL | Age: 53
Discharge: HOME | End: 2023-05-03
Admitting: THORACIC SURGERY (CARDIOTHORACIC VASCULAR SURGERY)
Payer: COMMERCIAL

## 2023-05-03 DIAGNOSIS — I73.9 PVD (PERIPHERAL VASCULAR DISEASE) WITH CLAUDICATION: ICD-10-CM

## 2023-05-03 PROCEDURE — 75635 CT ANGIO ABDOMINAL ARTERIES: CPT

## 2023-05-03 PROCEDURE — 25510000001 IOPAMIDOL 61 % SOLUTION: Performed by: THORACIC SURGERY (CARDIOTHORACIC VASCULAR SURGERY)

## 2023-05-03 RX ADMIN — IOPAMIDOL 100 ML: 612 INJECTION, SOLUTION INTRAVENOUS at 08:54

## 2023-05-08 ENCOUNTER — TELEPHONE (OUTPATIENT)
Dept: CARDIAC SURGERY | Facility: CLINIC | Age: 53
End: 2023-05-08

## 2023-05-08 NOTE — TELEPHONE ENCOUNTER
Caller: Kandy Noble Alma    Relationship to patient: Self    Best call back number: [phone]    Chief complaint: TRANSPORTATION     Type of visit: FOLLOW UP CT    Requested date: TELEPHONE VISIT OR [place] LOCATION     If rescheduling, when is the original appointment: 5/16/23    Additional notes: PATENT WOULD LIKE A  CALL BACK TO RESCHEDULE

## 2023-05-26 ENCOUNTER — OFFICE VISIT (OUTPATIENT)
Dept: CARDIAC SURGERY | Facility: CLINIC | Age: 53
End: 2023-05-26
Payer: COMMERCIAL

## 2023-05-26 DIAGNOSIS — I70.219 ATHEROSCLEROTIC PVD WITH INTERMITTENT CLAUDICATION: Primary | ICD-10-CM

## 2023-05-26 PROBLEM — R06.02 SHORTNESS OF BREATH: Status: RESOLVED | Noted: 2020-04-14 | Resolved: 2023-05-26

## 2023-05-26 PROBLEM — R07.89 OTHER CHEST PAIN: Status: RESOLVED | Noted: 2020-04-14 | Resolved: 2023-05-26

## 2023-05-26 PROBLEM — Z82.49 FAMILY HISTORY OF EARLY CAD: Status: RESOLVED | Noted: 2020-04-14 | Resolved: 2023-05-26

## 2023-05-26 PROBLEM — R19.7 DIARRHEA: Status: RESOLVED | Noted: 2017-05-09 | Resolved: 2023-05-26

## 2023-05-26 PROBLEM — R10.13 EPIGASTRIC PAIN: Status: RESOLVED | Noted: 2017-05-09 | Resolved: 2023-05-26

## 2023-05-26 PROBLEM — R10.32 LEFT LOWER QUADRANT PAIN: Status: RESOLVED | Noted: 2017-05-09 | Resolved: 2023-05-26

## 2023-05-26 PROBLEM — R60.0 PERIPHERAL EDEMA: Status: RESOLVED | Noted: 2020-04-14 | Resolved: 2023-05-26

## 2023-05-26 PROBLEM — R93.2 ABNORMAL CT OF LIVER: Status: RESOLVED | Noted: 2017-05-09 | Resolved: 2023-05-26

## 2023-05-26 PROBLEM — R12 HEARTBURN: Status: RESOLVED | Noted: 2017-05-09 | Resolved: 2023-05-26

## 2023-05-26 PROBLEM — R60.9 PERIPHERAL EDEMA: Status: RESOLVED | Noted: 2020-04-14 | Resolved: 2023-05-26

## 2023-05-26 PROBLEM — R42 LIGHTHEADEDNESS: Status: RESOLVED | Noted: 2020-04-14 | Resolved: 2023-05-26

## 2023-05-26 PROBLEM — R11.0 NAUSEA: Status: RESOLVED | Noted: 2017-05-09 | Resolved: 2023-05-26

## 2023-05-26 NOTE — PROGRESS NOTES
[facility] Cardiothoracic Surgery Office Follow Up Note    Date of Encounter: 2023     Name: Kandy Noble  : 1970     Referred By: No ref. provider found  PCP: Oriana More APRN    Chief Complaint:    Chief Complaint   Patient presents with   • Peripheral Vascular Disease     Follow up for peripheral vascular disease.       Subjective      History of Present Illness:    It was nice to see Kandy Noble in follow up.  She is a pleasant 52 y.o. female with PMH significant for hypertension, hyperlipidemia, diabetes mellitus, former tobacco dependence, and peripheral vascular disease.      Ms. Noble was initially seen on 2023 by Dr. Al for bilaterl lower extremity pain.  Patient reported both rest pain and exacerbation with ambulation.  She was initiated on Pletal 100 mg po twice a day.  Dr. Al recommended a CTA of the aorta with lower extremity runoff to better evaluate vasculature.  Patient presents via [facility] today for review of imaging.  She continues to report symptoms of claudication.  Patient denies ulceration or nonhealing wounds.  She reports walking daily as much as tolerated.  Patient stopped taking Pletal, stated this made her extremely nauseous.    Review of Systems:  Review of Systems   Constitutional: Negative. Negative for chills, decreased appetite, diaphoresis, fever, malaise/fatigue, night sweats and weight loss.   HENT: Negative.  Negative for congestion, hoarse voice, sore throat and stridor.    Cardiovascular: Positive for claudication, dyspnea on exertion, leg swelling and palpitations. Negative for chest pain, irregular heartbeat, near-syncope, orthopnea, paroxysmal nocturnal dyspnea and syncope.   Respiratory: Negative.  Negative for cough, hemoptysis, shortness of breath, sleep disturbances due to breathing, snoring, sputum production and wheezing.    Hematologic/Lymphatic: Negative.  Negative for adenopathy and bleeding problem. Does  not bruise/bleed easily.   Skin: Positive for color change. Negative for dry skin, itching, poor wound healing and rash.   Musculoskeletal: Positive for joint pain and muscle cramps. Negative for arthritis, back pain, falls and muscle weakness.   Gastrointestinal: Negative.  Negative for abdominal pain, anorexia, constipation, diarrhea, hematochezia, melena, nausea and vomiting.   Neurological: Negative for difficulty with concentration, disturbances in coordination, dizziness, loss of balance, numbness, seizures, vertigo and weakness.   Psychiatric/Behavioral: Negative for altered mental status, depression, memory loss and substance abuse. The patient has insomnia. The patient is not nervous/anxious.    Allergic/Immunologic: Negative.  Negative for persistent infections.       I have reviewed the following portions of the patient's history: allergies, current medications, past family history, past medical history, past social history, past surgical history and problem list and confirm it's accurate.    Allergies:  Allergies   Allergen Reactions   • Lisinopril Cough   • Pletal [Cilostazol] Nausea And Vomiting       Medications:      Current Outpatient Medications:   •  aspirin 81 MG tablet, Take 1 tablet by mouth Daily., Disp: 30 tablet, Rfl: 11  •  atorvastatin (LIPITOR) 20 MG tablet, Take 1 tablet by mouth Daily., Disp: , Rfl:   •  furosemide (LASIX) 20 MG tablet, Take 1 tablet by mouth Daily As Needed (swelling)., Disp: 90 tablet, Rfl: 3  •  gabapentin (NEURONTIN) 100 MG capsule, Take 1 capsule by mouth 3 (Three) Times a Day., Disp: , Rfl:   •  HYDROcodone-acetaminophen (NORCO) 7.5-325 MG per tablet, Take 1 tablet by mouth 3 (Three) Times a Day., Disp: , Rfl:   •  losartan (Cozaar) 50 MG tablet, Take 1 tablet by mouth As Needed (If Systolic is above 140, take Losartan and Bystolic, 1 in am and 1 in pm)., Disp: 90 tablet, Rfl: 3  •  metFORMIN (GLUCOPHAGE) 500 MG tablet, Take 250 mg by mouth Daily With Breakfast.  250, Disp: , Rfl:   •  nebivolol (Bystolic) 5 MG tablet, Take 1 tablet by mouth Daily As Needed (If Systolic is above 130)., Disp: 90 tablet, Rfl: 3  •  nitroglycerin (NITROSTAT) 0.4 MG SL tablet, 1 under the tongue as needed for angina, may repeat q5mins for up three doses, Disp: 45 tablet, Rfl: 2  •  potassium chloride 10 MEQ CR tablet, Take 1 tablet by mouth Daily As Needed (with lasix)., Disp: 90 tablet, Rfl: 3  •  cilostazol (PLETAL) 100 MG tablet, Take 1 tablet by mouth 2 (Two) Times a Day. (Patient not taking: Reported on 2023), Disp: 60 tablet, Rfl: 11  •  nicotine (NICODERM CQ) 7 MG/24HR patch, Place 1 patch on the skin as directed by provider Daily. (Patient not taking: Reported on 2023), Disp: 30 patch, Rfl: 6    History:   Past Medical History:   Diagnosis Date   • Arthritis    • Asthma    • B12 deficiency    • Back pain    • Bronchitis    • Chronic kidney disease     kidney stones   • COVID      , 2021   • COVID-19 vaccine administered     2021,2021, 2022 - Pfizer   • Depression    • Diabetes mellitus    • Diverticulosis    • Fatty liver    • Generalized headaches    • GERD (gastroesophageal reflux disease)    • High cholesterol    • Hyperlipidemia    • Hypertension    • Pancreatitis    • Pneumonia    • Wears dentures     UPPER       Past Surgical History:   Procedure Laterality Date   • APPENDECTOMY     • CARDIAC CATHETERIZATION      no stents placed   • CHOLECYSTECTOMY     • CHOLECYSTECTOMY N/A    • DIAGNOSTIC LAPAROSCOPY     • HYSTERECTOMY     • KIDNEY STONE SURGERY     • REPLACEMENT TOTAL KNEE      left   • SALPINGO OOPHORECTOMY Left    • SHOULDER SURGERY     • UPPER GASTROINTESTINAL ENDOSCOPY  27 years ago       Social History     Socioeconomic History   • Marital status:    Tobacco Use   • Smoking status: Former     Packs/day: 0.25     Years: 17.00     Pack years: 4.25     Types: Cigarettes     Quit date: 2023     Years since quittin.3   •  Smokeless tobacco: Never   • Tobacco comments:     Pt states that she quit smoking 2/2/2023   Vaping Use   • Vaping Use: Former   • Quit date: 2/10/2023   Substance and Sexual Activity   • Alcohol use: Not Currently     Comment: social   • Drug use: Never   • Sexual activity: Defer        Family History   Problem Relation Age of Onset   • Diabetes Mother    • Liver disease Father    • Kidney cancer Father    • Heart attack Father    • Hypertension Father    • Hyperlipidemia Father    • Colon cancer Maternal Grandfather        Objective     Physical Exam:  There were no vitals filed for this visit.   There is no height or weight on file to calculate BMI.    Physical Exam  Unable to obtain.    Imaging/Labs:  CT Angio Abdominal Aorta Bilateral Iliofem Runoff    Result Date: 5/3/2023  Essentially normal bilateral lower extremity arterial system with 2 vessel patency into the forefeet.  Hemangioma versus transient perfusion abnormality inferior right lobe of liver which appears similar to prior exam from 2017.   This report was signed and finalized on 5/3/2023 12:22 PM by Ewelina Lee MD.  CT Angio Abdominal Aorta Bilateral Iliofem Runoff (05/03/2023 08:54)    ..I personally reviewed this report and imaging.      Assessment / Plan      Assessment / Plan:  PMH significant for hypertension, hyperlipidemia, diabetes mellitus, former tobacco dependence, and peripheral vascular disease.      1.  Peripheral vascular disease  · Initially seen on 4/18/2023 by Dr. Al for bilateral lower extremity pain.  · Reported both rest pain and exacerbation with ambulation.  · Initiated on Pletal 100 mg po twice a day.  · Dr. Al recommended a CTA of the aorta with lower extremity runoff to better evaluate vasculature.  · Present via tele-health today for review of imaging.  · Continues to report symptoms of claudication.  · Denies ulceration or nonhealing wounds.  · Reports walking daily as much as tolerated.  · Stopped taking  Pletal, stated this made her extremely nauseous.  · CTA as resulted above under imaging/labs, with 2 vessel patient runoff.  · Continue daily structured walking regimen.  · Patient did not wish to attempt lower dose Pletal.      Patient Education:  ..A structured walking regiment is used to improve the circulation or blood flow in your legs.  Recognize that walking may hurt at first -- and that is good.  In fact, the goal is to walk at a pace that causes mild or moderate pain or tightness in your legs.  Pace yourself, stop to rest for a few minutes, but the resume walking.  This discomfort triggers your body to improve your circulation.  Repeat several times:  Walk at a pace that causes mild or moderate leg pain, then rest, and keep going.  Over time, you may find you are able to walk longer with less pain.  That is a sign that your blood vessels are recovering.  It may take months, so be patient and persistent.     Follow Up:   We will plan for follow-up in 1 year with MARK/PVR.    Or sooner for any further concerns or worsening sign and symptoms.  Patient encouraged to call the office with any questions or concerns.     Thank you for allowing me to participate in your care.  Best Regards,    ABA Lester  [facility] Cardiothoracic Surgery  05/26/23  09:49 EDT     I spent approximately ~10 minutes evaluating and discussing plan of care with Ms. Noble.

## 2023-07-05 NOTE — PROGRESS NOTES
Brynn Marquez Clothier is a 50 y.o. female     Chief Complaint   Patient presents with   • Follow-up     cath follow up       HPI    Problem list:    1.  Nonobstructive CAD  1.1 stress test 9/25/2020-very mild lateral and posterior lateral wall ischemia, post-rest EF 73%  1.2 left heart cath 10/21/2020-minimal irregularities in the circumflex, LAD and RCA, EF 65%, LVEDP 12-14  2.  Hyperlipidemia  3.  Palpitations  3.1 event monitor 6/11-6/12/2020-sinus tach  4.  Family history of early coronary artery disease  5.  Shortness of breath  5.1 echo 9/25/2020-EF 60 to 65%, diastolic dysfunction 1, trivial to mild TR, PA in the 20s  6.  Borderline diabetes mellitus type 2  7.  Peripheral edema  8.  Smoking habituation    Patient is a 42-year-old female who presents today for follow-up status post left heart cath.  She does continue to have chest tightness/pressure which she thinks probably is due to her rate.  She says that all the time when she gets very short of breath and her heart will flutter.  She says that she is going to see her PCP today at 11 so that they can look for other causes of her symptoms.  I did recommend possibly following up with pulmonary due to her history of smoking.  She denies any dizziness, presyncope, syncope or PND.  She does wake up on occasion smothering.  She does get swelling in her legs that she usually worse by the end of the day and goes down overnight.  She says she is short of breath all the time even with walking short distance.  She is also easily fatigued.  Patient does still smoke a pack a day.    We went over heart cath.    Current Outpatient Medications on File Prior to Visit   Medication Sig Dispense Refill   • aspirin 81 MG tablet Take 1 tablet by mouth Daily. 30 tablet 11   • atorvastatin (LIPITOR) 40 MG tablet Take 1 tablet by mouth Daily. 90 tablet 3   • ibuprofen (ADVIL,MOTRIN) 800 MG tablet Take 800 mg by mouth 2 (Two) Times a Day As Needed.     • nitroglycerin  Can you reach out to patient for a future appt, thanks. (NITROSTAT) 0.4 MG SL tablet 1 under the tongue as needed for angina, may repeat q5mins for up three doses 30 tablet 5   • pantoprazole (PROTONIX) 40 MG EC tablet Take 40 mg by mouth Daily.     • [DISCONTINUED] clopidogrel (PLAVIX) 75 MG tablet Take 1 tablet by mouth Daily. 30 tablet 11     No current facility-administered medications on file prior to visit.        ALLERGIES    Patient has no known allergies.    Past Medical History:   Diagnosis Date   • Arthritis    • Asthma    • B12 deficiency    • Back pain    • Bronchitis    • Chronic kidney disease     kidney stones   • Depression    • Diverticulosis    • Fatty liver    • Generalized headaches    • GERD (gastroesophageal reflux disease)    • High cholesterol    • Hyperlipidemia    • Pancreatitis    • Pneumonia    • Wears dentures     UPPER       Social History     Socioeconomic History   • Marital status:      Spouse name: Not on file   • Number of children: Not on file   • Years of education: Not on file   • Highest education level: Not on file   Tobacco Use   • Smoking status: Current Every Day Smoker     Packs/day: 1.00     Years: 17.00     Pack years: 17.00     Types: Cigarettes   • Smokeless tobacco: Never Used   Substance and Sexual Activity   • Alcohol use: Not Currently     Comment: social   • Drug use: Never   • Sexual activity: Defer       Family History   Problem Relation Age of Onset   • Liver disease Father    • Kidney cancer Father    • Heart attack Father    • Hypertension Father    • Hyperlipidemia Father    • Colon cancer Maternal Grandfather    • Diabetes Mother        Review of Systems   Constitutional: Positive for fatigue (fatigues easily). Negative for chills, diaphoresis and fever.   HENT: Negative for congestion, rhinorrhea and sore throat.    Eyes: Positive for visual disturbance (glasses).   Respiratory: Positive for cough, chest tightness (heavy) and shortness of breath (all the time; walk at all will get short of breath ).  "Negative for wheezing.    Cardiovascular: Positive for chest pain (chest tightness/pressure, there all of the time), palpitations (flutters) and leg swelling (worse by end of the day, go down overnight ).   Gastrointestinal: Positive for nausea. Negative for abdominal pain, blood in stool and vomiting.   Endocrine: Positive for heat intolerance (hot most of the time). Negative for cold intolerance.   Genitourinary: Positive for frequency. Negative for dysuria, hematuria and urgency.   Musculoskeletal: Positive for arthralgias (joints), back pain (lower back) and neck pain.        Knees are bothering her and feet tingle   Skin: Negative for rash and wound.   Allergic/Immunologic: Positive for environmental allergies. Negative for food allergies.   Neurological: Negative for dizziness, weakness and light-headedness.   Hematological: Does not bruise/bleed easily.   Psychiatric/Behavioral: Positive for sleep disturbance (admits to waking with smothering at night  ).       Objective   /96 (BP Location: Left arm, Patient Position: Sitting)   Pulse 98   Ht 154.9 cm (61\")   Wt 100 kg (221 lb)   SpO2 97%   BMI 41.76 kg/m²   Vitals:    11/13/20 0857   BP: 139/96   BP Location: Left arm   Patient Position: Sitting   Pulse: 98   SpO2: 97%   Weight: 100 kg (221 lb)   Height: 154.9 cm (61\")      Lab Results (most recent)     None        Physical Exam  Vitals signs reviewed.   Constitutional:       General: She is awake.      Appearance: Normal appearance. She is well-developed and well-groomed. She is obese.   HENT:      Head: Normocephalic.   Eyes:      General: Lids are normal.   Neck:      Vascular: No carotid bruit, hepatojugular reflux or JVD.   Cardiovascular:      Rate and Rhythm: Normal rate and regular rhythm.      Pulses:           Radial pulses are 2+ on the right side and 2+ on the left side.        Dorsalis pedis pulses are 2+ on the right side and 2+ on the left side.        Posterior tibial pulses are 2+ " on the right side and 2+ on the left side.      Heart sounds: Normal heart sounds.   Pulmonary:      Effort: Pulmonary effort is normal.      Breath sounds: Normal air entry. Examination of the right-lower field reveals decreased breath sounds. Examination of the left-lower field reveals decreased breath sounds. Decreased breath sounds present.   Abdominal:      General: Bowel sounds are normal.      Palpations: Abdomen is soft.   Musculoskeletal:      Right lower leg: Edema (trace ) present.      Left lower leg: Edema (trace) present.   Skin:     General: Skin is warm and dry.   Neurological:      Mental Status: She is alert and oriented to person, place, and time.   Psychiatric:         Attention and Perception: Attention and perception normal.         Mood and Affect: Mood and affect normal.         Speech: Speech normal.         Behavior: Behavior normal. Behavior is cooperative.         Thought Content: Thought content normal.         Cognition and Memory: Cognition and memory normal.         Judgment: Judgment normal.         Procedure   Procedures         Assessment/Plan      Diagnosis Plan   1. Coronary artery disease involving native coronary artery of native heart without angina pectoris     2. Hyperlipidemia, unspecified hyperlipidemia type     3. Grade I diastolic dysfunction  furosemide (LASIX) 20 MG tablet   4. Palpitations     5. Shortness of breath     6. Peripheral edema  furosemide (LASIX) 20 MG tablet   7. Smoking     8. Chest tightness         Return in about 6 months (around 5/13/2021).    CAD-patient's on aspirin and statin.  Hyperlipidemia-patient's on Lipitor monitor by PCP.  Shortness of breath/chest tightness-patient's fungal by PCP for other possible causes.  Peripheral edema/diastolic dysfunction 1-patient will use Lasix as needed.  She will continue her medication regimen.  She will follow-up in 6 months or sooner if any changes.       Kandy Alma Clothier  reports that she has been  smoking cigarettes. She has a 17.00 pack-year smoking history. She has never used smokeless tobacco.. I have educated her on the risk of diseases from using tobacco products such as cancer, COPD and heart disease.     I advised her to quit and she is not willing to quit.    I spent 3  minutes counseling the patient.         Patient's Body mass index is 41.76 kg/m². BMI is above normal parameters. Recommendations include: educational material and referral to primary care.      Electronically signed by:

## 2023-07-27 ENCOUNTER — TELEPHONE (OUTPATIENT)
Dept: CARDIOLOGY | Facility: CLINIC | Age: 53
End: 2023-07-27

## 2023-07-27 ENCOUNTER — OFFICE VISIT (OUTPATIENT)
Dept: CARDIOLOGY | Facility: CLINIC | Age: 53
End: 2023-07-27
Payer: COMMERCIAL

## 2023-07-27 VITALS
OXYGEN SATURATION: 98 % | HEART RATE: 76 BPM | BODY MASS INDEX: 36.74 KG/M2 | DIASTOLIC BLOOD PRESSURE: 75 MMHG | HEIGHT: 61 IN | SYSTOLIC BLOOD PRESSURE: 121 MMHG | WEIGHT: 194.6 LBS

## 2023-07-27 DIAGNOSIS — E78.2 MIXED HYPERLIPIDEMIA: ICD-10-CM

## 2023-07-27 DIAGNOSIS — I51.89 GRADE I DIASTOLIC DYSFUNCTION: ICD-10-CM

## 2023-07-27 DIAGNOSIS — F17.200 SMOKING: ICD-10-CM

## 2023-07-27 DIAGNOSIS — G89.29 CHRONIC NONINTRACTABLE HEADACHE, UNSPECIFIED HEADACHE TYPE: ICD-10-CM

## 2023-07-27 DIAGNOSIS — I73.9 PVD (PERIPHERAL VASCULAR DISEASE) WITH CLAUDICATION: ICD-10-CM

## 2023-07-27 DIAGNOSIS — R00.2 PALPITATIONS: ICD-10-CM

## 2023-07-27 DIAGNOSIS — R51.9 CHRONIC NONINTRACTABLE HEADACHE, UNSPECIFIED HEADACHE TYPE: ICD-10-CM

## 2023-07-27 DIAGNOSIS — R09.89 LABILE HYPERTENSION: ICD-10-CM

## 2023-07-27 DIAGNOSIS — I25.10 CORONARY ARTERY DISEASE INVOLVING NATIVE CORONARY ARTERY OF NATIVE HEART WITHOUT ANGINA PECTORIS: Primary | ICD-10-CM

## 2023-07-27 DIAGNOSIS — I10 PRIMARY HYPERTENSION: ICD-10-CM

## 2023-07-27 RX ORDER — NEBIVOLOL 2.5 MG/1
2.5 TABLET ORAL DAILY
Qty: 30 TABLET | Refills: 11 | Status: SHIPPED | OUTPATIENT
Start: 2023-07-27

## 2023-07-27 RX ORDER — LORATADINE 10 MG/1
10 TABLET ORAL DAILY PRN
COMMUNITY
Start: 2023-06-28

## 2023-07-27 RX ORDER — LINACLOTIDE 145 UG/1
145 CAPSULE, GELATIN COATED ORAL DAILY
COMMUNITY
Start: 2023-05-02

## 2023-07-27 RX ORDER — RIZATRIPTAN BENZOATE 5 MG/1
TABLET, ORALLY DISINTEGRATING ORAL
Qty: 60 TABLET | Refills: 3 | Status: SHIPPED | OUTPATIENT
Start: 2023-07-27

## 2023-07-27 RX ORDER — ATORVASTATIN CALCIUM 40 MG/1
40 TABLET, FILM COATED ORAL DAILY
Qty: 90 TABLET | Refills: 3 | Status: SHIPPED | OUTPATIENT
Start: 2023-07-27

## 2023-07-27 RX ORDER — GABAPENTIN 300 MG/1
300 CAPSULE ORAL 3 TIMES DAILY PRN
COMMUNITY
Start: 2023-07-21

## 2023-07-27 RX ORDER — ALBUTEROL SULFATE 90 UG/1
AEROSOL, METERED RESPIRATORY (INHALATION)
COMMUNITY
Start: 2023-07-08

## 2023-07-27 RX ORDER — MONTELUKAST SODIUM 10 MG/1
10 TABLET ORAL NIGHTLY PRN
COMMUNITY
Start: 2023-06-28

## 2023-07-27 RX ORDER — LOSARTAN POTASSIUM 25 MG/1
25 TABLET ORAL DAILY
Qty: 30 TABLET | Refills: 11 | Status: SHIPPED | OUTPATIENT
Start: 2023-07-27

## 2023-07-27 RX ORDER — BUDESONIDE AND FORMOTEROL FUMARATE DIHYDRATE 160; 4.5 UG/1; UG/1
2 AEROSOL RESPIRATORY (INHALATION)
COMMUNITY
Start: 2023-07-26

## 2023-07-27 RX ORDER — TIRZEPATIDE 5 MG/.5ML
5 INJECTION, SOLUTION SUBCUTANEOUS WEEKLY
COMMUNITY
Start: 2023-05-02

## 2023-07-27 NOTE — PROGRESS NOTES
Subjective   Kandy Noble is a 53 y.o. female     Chief Complaint   Patient presents with    Follow-up     Here for routine f/u    Palpitations    Coronary Artery Disease    Hyperlipidemia       PROBLEM LIST:     1.  Nonobstructive CAD  1.1 stress test 9/25/2020-very mild lateral and posterior lateral wall ischemia, post-rest EF 73%  1.2 left heart cath 10/21/2020-minimal irregularities in the circumflex, LAD and RCA, EF 65%, LVEDP 12-14  1.3 stress test 8/30/2022-no evidence of ischemia, post-rest EF 73%  2.  Hyperlipidemia  3.  Palpitations  3.1 Holter monitor, 1-, NSR / SB, PVCS/PAC's  4.  Family history of early coronary artery disease  5.  Shortness of breath  5.1 Echo/3/2022-EF 56 to 60%, borderline LVH, diastolic dysfunction 1 trivial MR and trivial to mild TR PA in the low 30s  6.  Borderline diabetes mellitus type 2  7.  Peripheral edema  8.  Smoking habituation  9. History of COVID-19 2020; 12/2021  10.  Carotid artery disease  10.1 carotid artery ultrasound 8/3/2022-16 to 49% stenosis both internal carotid arteries, antegrade flow vertebral arteries  11.  Hypertension    Specialty Problems          Cardiology Problems    Hyperlipidemia        Palpitations        Grade I diastolic dysfunction        Coronary artery disease involving native coronary artery of native heart without angina pectoris        PVD (peripheral vascular disease) with claudication             HPI:  Ms. Noble returns for follow-up on the above.    She describes large swings of systemic blood pressures in the hypertensive and hypotensive ranges.  Very frequently, when hypertensive, she has precipitation of severe, throbbing, occipital headache.  This is frequently bilateral.  She also has photophobia and nausea.  Additionally, she describes left supraclavicular discomfort when blood pressures are elevated.    The patient also has items compatible with sensed extrasystoles.  Event monitor demonstrated rare PACs and PVCs  but no sustained ectopy or block.  Ms. Noble stopped smoking in February and has had marked improvement in her lower extremity exertional pain.  She is now nearly completely free of claudication.  She has minimal lower extremity edema.                        PRIOR MEDICATIONS    Current Outpatient Medications on File Prior to Visit   Medication Sig Dispense Refill    albuterol sulfate  (90 Base) MCG/ACT inhaler prn      aspirin 81 MG tablet Take 1 tablet by mouth Daily. 30 tablet 11    atorvastatin (LIPITOR) 20 MG tablet Take 1 tablet by mouth Daily.      gabapentin (NEURONTIN) 300 MG capsule Take 1 capsule by mouth 3 (Three) Times a Day As Needed.      HYDROcodone-acetaminophen (NORCO) 7.5-325 MG per tablet Take 1 tablet by mouth 3 (Three) Times a Day.      Linzess 145 MCG capsule capsule Take 1 capsule by mouth Daily.      loratadine (CLARITIN) 10 MG tablet Take 1 tablet by mouth Daily As Needed.      losartan (Cozaar) 50 MG tablet Take 1 tablet by mouth As Needed (If Systolic is above 140, take Losartan and Bystolic, 1 in am and 1 in pm). (Patient taking differently: Take 1 tablet by mouth As Needed.) 90 tablet 3    metFORMIN (GLUCOPHAGE) 500 MG tablet Take 0.5 tablets by mouth Daily With Breakfast. 250      montelukast (SINGULAIR) 10 MG tablet Take 1 tablet by mouth At Night As Needed.      Mounjaro 5 MG/0.5ML solution pen-injector Inject 0.5 mL under the skin into the appropriate area as directed 1 (One) Time Per Week.      nebivolol (Bystolic) 5 MG tablet Take 1 tablet by mouth Daily As Needed (If Systolic is above 130). 90 tablet 3    Symbicort 160-4.5 MCG/ACT inhaler Inhale 2 puffs 2 (Two) Times a Day.      furosemide (LASIX) 20 MG tablet Take 1 tablet by mouth Daily As Needed (swelling). 90 tablet 3    isosorbide mononitrate (IMDUR) 30 MG 24 hr tablet Take 1 tablet by mouth Daily. 30 tablet 11    nitroglycerin (NITROSTAT) 0.4 MG SL tablet 1 under the tongue as needed for angina, may repeat q5mins  for up three doses (Patient not taking: Reported on 7/27/2023) 45 tablet 2    potassium chloride 10 MEQ CR tablet Take 1 tablet by mouth Daily As Needed (with lasix). 90 tablet 3    [DISCONTINUED] gabapentin (NEURONTIN) 100 MG capsule Take 1 capsule by mouth 3 (Three) Times a Day.       No current facility-administered medications on file prior to visit.       ALLERGIES:    Lisinopril and Pletal [cilostazol]    PAST MEDICAL HISTORY:    Past Medical History:   Diagnosis Date    Abnormal CT of liver 5/9/2017    Arthritis     Asthma     B12 deficiency     Back pain     Bronchitis     Chronic kidney disease     kidney stones    COVID     2020 , 12/2021    COVID-19 vaccine administered     07/17/2021,08/07/2021, 02/05/2022 - Pfizer    Depression     Diabetes mellitus     Diarrhea 5/9/2017    Diverticulosis     Epigastric pain 5/9/2017    Family history of early CAD 4/14/2020    Fatty liver     Generalized headaches     GERD (gastroesophageal reflux disease)     Heartburn 5/9/2017    High cholesterol     Hyperlipidemia     Hypertension     Left lower quadrant pain 5/9/2017    Consistent with possible diverticulitis.    Lightheadedness 4/14/2020    Nausea 5/9/2017    Other chest pain 4/14/2020    Pancreatitis     Peripheral edema 4/14/2020    Pneumonia     Shortness of breath 4/14/2020    Wears dentures     UPPER       SURGICAL HISTORY:    Past Surgical History:   Procedure Laterality Date    APPENDECTOMY      CARDIAC CATHETERIZATION      no stents placed    CHOLECYSTECTOMY      CHOLECYSTECTOMY N/A     DIAGNOSTIC LAPAROSCOPY      HYSTERECTOMY  2017    KIDNEY STONE SURGERY      REPLACEMENT TOTAL KNEE      left    SALPINGO OOPHORECTOMY Left     SHOULDER SURGERY      UPPER GASTROINTESTINAL ENDOSCOPY  27 years ago       SOCIAL HISTORY:    Social History     Socioeconomic History    Marital status:     Number of children: 7   Tobacco Use    Smoking status: Former     Packs/day: 0.25     Years: 17.00     Pack years: 4.25  "    Types: Cigarettes     Quit date: 2023     Years since quittin.4    Smokeless tobacco: Never    Tobacco comments:     Pt states that she quit smoking 2023   Vaping Use    Vaping Use: Former    Quit date: 2/10/2023   Substance and Sexual Activity    Alcohol use: Not Currently     Comment: social    Drug use: Never    Sexual activity: Defer       FAMILY HISTORY:    Family History   Problem Relation Age of Onset    Diabetes Mother     Liver disease Father     Kidney cancer Father     Heart attack Father     Hypertension Father     Hyperlipidemia Father     Colon cancer Maternal Grandfather        Review of Systems   Constitutional:  Positive for fatigue.   HENT: Negative.     Eyes:  Positive for visual disturbance (glasses prn).   Respiratory:  Positive for shortness of breath (with heat and exertion).    Cardiovascular:  Positive for chest pain (heaviness / pressure), palpitations and leg swelling (ankles/ feet).   Gastrointestinal: Negative.    Endocrine: Negative.    Genitourinary: Negative.    Musculoskeletal:  Positive for arthralgias and myalgias.   Skin: Negative.    Allergic/Immunologic: Positive for environmental allergies.   Neurological: Negative.    Hematological: Negative.    Psychiatric/Behavioral:  Positive for sleep disturbance.      VISIT VITALS:  Vitals:    23 0908   BP: 121/75   BP Location: Left arm   Patient Position: Sitting   Pulse: 76   SpO2: 98%   Weight: 88.3 kg (194 lb 9.6 oz)   Height: 154.9 cm (60.98\")      /75 (BP Location: Left arm, Patient Position: Sitting)   Pulse 76   Ht 154.9 cm (60.98\")   Wt 88.3 kg (194 lb 9.6 oz)   SpO2 98%   BMI 36.79 kg/m²     RECENT LABS:    Objective       Physical Exam  Vitals and nursing note reviewed.   Constitutional:       General: She is not in acute distress.     Appearance: She is well-developed.   HENT:      Head: Normocephalic and atraumatic.   Eyes:      Conjunctiva/sclera: Conjunctivae normal.      Pupils: Pupils are " equal, round, and reactive to light.   Neck:      Vascular: No carotid bruit, hepatojugular reflux or JVD.      Trachea: No tracheal deviation.      Comments: Nl. Carotid upstrokes  Cardiovascular:      Rate and Rhythm: Normal rate and regular rhythm.      Pulses:           Radial pulses are 2+ on the right side and 2+ on the left side.      Heart sounds: Normal heart sounds, S1 normal and S2 normal. No murmur heard.    No friction rub. S4 sounds present. No S3 sounds.   Pulmonary:      Effort: Pulmonary effort is normal.      Breath sounds: Normal breath sounds. No wheezing, rhonchi or rales.      Comments: Nl. Expir. Phase  Mildly decreased Breath sound intensity    Abdominal:      General: Bowel sounds are normal. There is no distension or abdominal bruit.      Palpations: Abdomen is soft. There is no mass.      Tenderness: There is no abdominal tenderness. There is no guarding or rebound.      Comments: No organomegaly   Musculoskeletal:         General: No tenderness or deformity. Normal range of motion.      Cervical back: Normal range of motion and neck supple.      Right lower leg: No edema.      Left lower leg: No edema.      Comments: LLE, no edema, palpable pedal pulses  RLE, no edema, palpable pedal pulses   Skin:     General: Skin is warm and dry.      Coloration: Skin is not pale.      Findings: No erythema or rash.   Neurological:      Mental Status: She is alert and oriented to person, place, and time.   Psychiatric:         Behavior: Behavior normal.         Thought Content: Thought content normal.         Judgment: Judgment normal.       Procedures      Assessment & Plan   #1.  Chest pain.  The patient had minimal nonobstructive coronary disease at prior catheterization and currently has symptoms very typical for angina.  Healthy that further evaluation or restratification of chest discomfort is indicated at this time but maximal risk factor modification should be pursued.  In that regard we will  check recent fasting lipid profile liver enzymes.    2.  Labile hypertension.  I think that as needed diastolic and losartan are exacerbating swings in blood pressure.  I have asked the patient to take Bystolic 2.5 mg daily and losartan 25 mg daily with no as needed dosing.  I explained that she will have variations in blood pressure but that, over a period of 1 to 2 weeks, marked swings in blood pressure should be significantly improved.  Additionally, I asked the patient to check her blood pressure only once daily as she readily admits that she checks her blood pressure and becomes anxious, and symptoms can worsen.    3.  Vascular headache.  The patient describes nearly daily headaches suggestive of status migrainous.  We will trial as needed triptan in the form of Maxalt MLT and I asked Ms. Noble to follow with Oriana Green with regard to do the need for further evaluation and treatment of those symptoms.    4.  Coronary artery disease.  We will pursue maximal risk factor modification as above.  In that regard the patient was applauded on smoking cessation.    5.  Ms. Noble will follow with us Ly as instructed we will plan on seeing her in follow-up in 6 months or on appearing basis as discussed.  No diagnosis found.    No follow-ups on file.         Kandy Noble  reports that she quit smoking about 5 months ago. Her smoking use included cigarettes. She has a 4.25 pack-year smoking history. She has never used smokeless tobacco.. I have educated her on the risk of diseases from using tobacco products such as cancer, COPD, and heart disease.               Class 2 Severe Obesity (BMI >=35 and <=39.9). Obesity-related health conditions include the following: hypertension, diabetes mellitus, and dyslipidemias. Obesity is  pcp addressing . BMI is  being addressed . We discussed portion control and increasing exercise.               Electronically signed by:    Scribed for Lars Mojica MD by Melinda  MAXIMILIAN Davis on July 27, 2023  at 09:13 EDT    I, Lars Mojica MD personally performed the services described in this documentation as scribed by the above named individual in my presence, and it is both accurate and complete. July 27, 2023 09:13 EDT      Dictated Utilizing Dragon Dictation: Part of this note may be an electronic transcription/translation of spoken language to printed text using the Dragon Dictation System.

## 2023-07-27 NOTE — TELEPHONE ENCOUNTER
Caller: Kandy Noble Alma    Relationship: Self    Best call back number: [phone]     What is the best time to reach you: ANYTIME     What was the call regarding: PT REPORTS THAT THEY WAS GIVEN A CALL 7/27/23 @ APPROX 10:44AM  BUT THERE IS NO DOCUMENTATION IN THE CHART ABOUT THIS. IF THIS WAS NOT A MISTAKE, PLEASE CALL PT BACK.     Is it okay if the provider responds through [facility]: THAT'S FINE

## 2023-07-27 NOTE — TELEPHONE ENCOUNTER
Patient seen this am and recent labs were unavail. Results received and LDL 90 down from 159  . V/O per Dr. Fitch to increase lipitor to 40 mg nightly and repeat fasting lipid/hepatic in 6-8 weeks. Called and discussed above orders and verbalized she understood. Updated script sent to pharmacy. PH,LPN

## 2023-10-26 ENCOUNTER — LAB (OUTPATIENT)
Dept: LAB | Facility: HOSPITAL | Age: 53
End: 2023-10-26
Payer: COMMERCIAL

## 2023-10-26 DIAGNOSIS — E78.2 MIXED HYPERLIPIDEMIA: ICD-10-CM

## 2023-10-26 DIAGNOSIS — I25.10 CORONARY ARTERY DISEASE INVOLVING NATIVE CORONARY ARTERY OF NATIVE HEART WITHOUT ANGINA PECTORIS: ICD-10-CM

## 2023-10-26 LAB
ALBUMIN SERPL-MCNC: 4.2 G/DL (ref 3.5–5.2)
ALP SERPL-CCNC: 105 U/L (ref 39–117)
ALT SERPL W P-5'-P-CCNC: 22 U/L (ref 1–33)
AST SERPL-CCNC: 20 U/L (ref 1–32)
BILIRUB CONJ SERPL-MCNC: <0.2 MG/DL (ref 0–0.3)
BILIRUB INDIRECT SERPL-MCNC: NORMAL MG/DL
BILIRUB SERPL-MCNC: 0.4 MG/DL (ref 0–1.2)
CHOLEST SERPL-MCNC: 122 MG/DL (ref 0–200)
HDLC SERPL-MCNC: 36 MG/DL (ref 40–60)
LDLC SERPL CALC-MCNC: 60 MG/DL (ref 0–100)
LDLC/HDLC SERPL: 1.54 {RATIO}
PROT SERPL-MCNC: 6.8 G/DL (ref 6–8.5)
TRIGL SERPL-MCNC: 152 MG/DL (ref 0–150)
VLDLC SERPL-MCNC: 26 MG/DL (ref 5–40)

## 2023-10-26 PROCEDURE — 80076 HEPATIC FUNCTION PANEL: CPT | Performed by: INTERNAL MEDICINE

## 2023-10-26 PROCEDURE — 80061 LIPID PANEL: CPT | Performed by: INTERNAL MEDICINE

## 2023-11-03 ENCOUNTER — TELEPHONE (OUTPATIENT)
Dept: CARDIOLOGY | Facility: CLINIC | Age: 53
End: 2023-11-03
Payer: COMMERCIAL

## 2023-11-03 NOTE — TELEPHONE ENCOUNTER
Caller: Kandy Noble Alma    Relationship to patient: Self    Best call back number: [phone]    Patient is needing: TO DISCUSS LAB WORK THAT WAS PERFORMED ON 10.26.23, PLEASE ADVISE THANK YOU

## 2024-02-07 ENCOUNTER — OFFICE VISIT (OUTPATIENT)
Dept: CARDIOLOGY | Facility: CLINIC | Age: 54
End: 2024-02-07
Payer: COMMERCIAL

## 2024-02-07 VITALS
OXYGEN SATURATION: 97 % | SYSTOLIC BLOOD PRESSURE: 107 MMHG | HEART RATE: 72 BPM | WEIGHT: 186 LBS | HEIGHT: 61 IN | BODY MASS INDEX: 35.12 KG/M2 | DIASTOLIC BLOOD PRESSURE: 66 MMHG

## 2024-02-07 DIAGNOSIS — I25.10 CORONARY ARTERY DISEASE INVOLVING NATIVE CORONARY ARTERY OF NATIVE HEART WITHOUT ANGINA PECTORIS: Primary | ICD-10-CM

## 2024-02-07 DIAGNOSIS — R00.2 PALPITATIONS: ICD-10-CM

## 2024-02-07 DIAGNOSIS — I51.89 GRADE I DIASTOLIC DYSFUNCTION: ICD-10-CM

## 2024-02-07 DIAGNOSIS — I10 PRIMARY HYPERTENSION: ICD-10-CM

## 2024-02-07 DIAGNOSIS — E78.2 MIXED HYPERLIPIDEMIA: ICD-10-CM

## 2024-02-07 DIAGNOSIS — I73.9 PVD (PERIPHERAL VASCULAR DISEASE) WITH CLAUDICATION: ICD-10-CM

## 2024-02-07 DIAGNOSIS — Z87.891 FORMER SMOKER: ICD-10-CM

## 2024-02-07 PROBLEM — F17.200 SMOKING: Status: RESOLVED | Noted: 2020-04-14 | Resolved: 2024-02-07

## 2024-02-07 PROBLEM — IMO0001 SMOKING: Status: RESOLVED | Noted: 2020-04-14 | Resolved: 2024-02-07

## 2024-02-07 RX ORDER — DOCUSATE SODIUM 250 MG
250 CAPSULE ORAL DAILY
COMMUNITY
Start: 2024-01-09

## 2024-02-07 RX ORDER — LACTULOSE 10 G/15ML
30 SOLUTION ORAL
COMMUNITY
Start: 2024-01-09

## 2024-02-07 RX ORDER — LUBIPROSTONE 8 UG/1
8 CAPSULE ORAL 2 TIMES DAILY WITH MEALS
COMMUNITY
Start: 2024-01-09 | End: 2024-02-08

## 2024-02-07 RX ORDER — POLYETHYLENE GLYCOL 3350 17 G/17G
17 POWDER, FOR SOLUTION ORAL DAILY PRN
COMMUNITY
Start: 2024-01-08 | End: 2024-02-07

## 2024-02-07 RX ORDER — TIRZEPATIDE 10 MG/.5ML
10 INJECTION, SOLUTION SUBCUTANEOUS WEEKLY
COMMUNITY
Start: 2024-01-08

## 2024-02-07 RX ORDER — OLMESARTAN MEDOXOMIL 5 MG/1
5 TABLET ORAL DAILY
Qty: 30 TABLET | Refills: 11 | Status: SHIPPED | OUTPATIENT
Start: 2024-02-07

## 2024-02-07 NOTE — PROGRESS NOTES
Subjective   Kandy Noble is a 53 y.o. female     Chief Complaint   Patient presents with    Follow-up     Here for 6 mo. F/u    Coronary Artery Disease    Hyperlipidemia    Palpitations       PROBLEM LIST:     1.  Nonobstructive CAD  1.1 stress test 9/25/2020-very mild lateral and posterior lateral wall ischemia, post-rest EF 73%  1.2 left heart cath 10/21/2020-minimal irregularities in the circumflex, LAD and RCA, EF 65%, LVEDP 12-14  1.3 stress test 8/30/2022-no evidence of ischemia, post-rest EF 73%  2.  Hyperlipidemia  3.  Palpitations  3.1 Holter monitor, 1-, NSR / SB, PVCS/PAC's  4.  Family history of early coronary artery disease  5.  Shortness of breath  5.1 Echo/3/2022-EF 56 to 60%, borderline LVH, diastolic dysfunction 1 trivial MR and trivial to mild TR PA in the low 30s  6.  Borderline diabetes mellitus type 2  7.  Peripheral edema  8.  Smoking habituation  9. History of COVID-19 2020; 12/2021  10.  Carotid artery disease  10.1 carotid artery ultrasound 8/3/2022-16 to 49% stenosis both internal carotid arteries, antegrade flow vertebral arteries  11.  Hypertension    Specialty Problems          Cardiology Problems    Hyperlipidemia        Palpitations        Grade I diastolic dysfunction        Coronary artery disease involving native coronary artery of native heart without angina pectoris        PVD (peripheral vascular disease) with claudication             HPI:    Ms. Noble returns for follow-up on the above.    She has done well since the time of her last visit.  She has no chest pain, orthopnea, PND, or lower extremity edema.  She continues to have occasional palpitations.  Event monitor last year demonstrated only rare PACs and PVCs with no correlation with symptoms.  However, by today's description, I believe the patient is having sensed extrasystoles.  The patient also describes tenderness in the left upper precordial region radiating to the medial aspect of the sternum, and  anterior aspect of the sternocleidomastoid.  This discomfort is worsened by palpation and the patient feels that she has the symptoms more when she is emotionally stressed.  On rare occasion symptoms are accompanied by diaphoresis but not shortness of breath.  Symptoms or not pleuritic.    The patient also continues to have chronic leg pain which, by her description today, represents radiculopathy.  She describes that blood pressures have been much better controlled with changes made at the time of her last visit.  Lipids are also well-controlled except for mildly elevated triglycerides per her report.  Ms. Noble stopped smoking a year ago and was applauded in that effort.                          PRIOR MEDICATIONS    Current Outpatient Medications on File Prior to Visit   Medication Sig Dispense Refill    albuterol sulfate  (90 Base) MCG/ACT inhaler prn      aspirin 81 MG tablet Take 1 tablet by mouth Daily. 30 tablet 11    atorvastatin (LIPITOR) 40 MG tablet Take 1 tablet by mouth Daily. 90 tablet 3    docusate sodium (COLACE) 250 MG capsule Take 1 capsule by mouth Daily.      gabapentin (NEURONTIN) 300 MG capsule Take 1 capsule by mouth 3 (Three) Times a Day As Needed.      HYDROcodone-acetaminophen (NORCO) 7.5-325 MG per tablet Take 1 tablet by mouth 3 (Three) Times a Day.      lactulose (CHRONULAC) 10 GM/15ML solution Take 30 mL by mouth. Bid-tid      loratadine (CLARITIN) 10 MG tablet Take 1 tablet by mouth Daily As Needed.      losartan (Cozaar) 25 MG tablet Take 1 tablet by mouth Daily. 30 tablet 11    lubiprostone (AMITIZA) 8 MCG capsule Take 1 capsule by mouth 2 (Two) Times a Day With Meals.      montelukast (SINGULAIR) 10 MG tablet Take 1 tablet by mouth At Night As Needed.      Mounjaro 10 MG/0.5ML solution pen-injector pen Inject 0.5 mL under the skin into the appropriate area as directed 1 (One) Time Per Week.      nebivolol (Bystolic) 2.5 MG tablet Take 1 tablet by mouth Daily. 30 tablet 11     polyethylene glycol (MIRALAX) 17 g packet Take 17 g by mouth Daily As Needed.      rizatriptan MLT (Maxalt-MLT) 5 MG disintegrating tablet Take 1 tab at onset of Headache and may take another tab 30 minutes if needed with max 2 tabs per 24 hours. 60 tablet 3    Symbicort 160-4.5 MCG/ACT inhaler Inhale 2 puffs 2 (Two) Times a Day.      metFORMIN (GLUCOPHAGE) 500 MG tablet Take 0.5 tablets by mouth Daily With Breakfast. 250      nitroglycerin (NITROSTAT) 0.4 MG SL tablet 1 under the tongue as needed for angina, may repeat q5mins for up three doses (Patient not taking: Reported on 7/27/2023) 45 tablet 2    [DISCONTINUED] Linzess 145 MCG capsule capsule Take 1 capsule by mouth Daily.      [DISCONTINUED] Mounjaro 5 MG/0.5ML solution pen-injector Inject 0.5 mL under the skin into the appropriate area as directed 1 (One) Time Per Week.       No current facility-administered medications on file prior to visit.       ALLERGIES:    Lisinopril and Pletal [cilostazol]    PAST MEDICAL HISTORY:    Past Medical History:   Diagnosis Date    Abnormal CT of liver 5/9/2017    Arthritis     Asthma     B12 deficiency     Back pain     Bronchitis     Chronic kidney disease     kidney stones    COVID     2020 , 12/2021    COVID-19 vaccine administered     07/17/2021,08/07/2021, 02/05/2022 - Pfizer    Depression     Diabetes mellitus     Diarrhea 5/9/2017    Diverticulosis     Epigastric pain 5/9/2017    Family history of early CAD 4/14/2020    Fatty liver     Generalized headaches     GERD (gastroesophageal reflux disease)     Heartburn 5/9/2017    High cholesterol     Hyperlipidemia     Hypertension     Left lower quadrant pain 5/9/2017    Consistent with possible diverticulitis.    Lightheadedness 4/14/2020    Nausea 5/9/2017    Other chest pain 4/14/2020    Pancreatitis     Peripheral edema 4/14/2020    Pneumonia     Shortness of breath 4/14/2020    Wears dentures     UPPER       SURGICAL HISTORY:    Past Surgical History:    Procedure Laterality Date    APPENDECTOMY      CARDIAC CATHETERIZATION      no stents placed    CHOLECYSTECTOMY      CHOLECYSTECTOMY N/A     DIAGNOSTIC LAPAROSCOPY      HYSTERECTOMY  2017    KIDNEY STONE SURGERY      REPLACEMENT TOTAL KNEE      left    SALPINGO OOPHORECTOMY Left     SHOULDER SURGERY      UPPER GASTROINTESTINAL ENDOSCOPY  27 years ago       SOCIAL HISTORY:    Social History     Socioeconomic History    Marital status:     Number of children: 7   Tobacco Use    Smoking status: Former     Packs/day: 0.25     Years: 17.00     Additional pack years: 0.00     Total pack years: 4.25     Types: Cigarettes     Quit date: 2023     Years since quittin.0    Smokeless tobacco: Never    Tobacco comments:     Pt states that she quit smoking 2023   Vaping Use    Vaping Use: Former    Quit date: 2/10/2023   Substance and Sexual Activity    Alcohol use: Not Currently     Comment: social    Drug use: Never    Sexual activity: Defer       FAMILY HISTORY:    Family History   Problem Relation Age of Onset    Diabetes Mother     Liver disease Father     Kidney cancer Father     Heart attack Father     Hypertension Father     Hyperlipidemia Father     Colon cancer Maternal Grandfather        Review of Systems   Constitutional:  Positive for fatigue.   Eyes:  Positive for visual disturbance (glasses prn).   Respiratory:  Positive for shortness of breath.    Cardiovascular:  Positive for chest pain, palpitations and leg swelling.   Gastrointestinal:  Positive for constipation.   Endocrine: Negative.    Genitourinary: Negative.    Musculoskeletal:  Positive for arthralgias, back pain, gait problem (ambulates with cane) and myalgias.   Skin: Negative.    Allergic/Immunologic: Positive for environmental allergies.   Neurological:  Positive for headaches (severe, almost daily. Ct done recently. Neg. per report.). Negative for dizziness, syncope and light-headedness.        Denies stroke like sx's  "  Hematological:  Bruises/bleeds easily.   Psychiatric/Behavioral: Negative.         VISIT VITALS:  Vitals:    02/07/24 0907   BP: 107/66   BP Location: Left arm   Patient Position: Sitting   Pulse: 72   SpO2: 97%   Weight: 84.4 kg (186 lb)   Height: 154.9 cm (60.98\")      /66 (BP Location: Left arm, Patient Position: Sitting)   Pulse 72   Ht 154.9 cm (60.98\")   Wt 84.4 kg (186 lb)   SpO2 97%   BMI 35.16 kg/m²     RECENT LABS:    Objective       Physical Exam  Vitals and nursing note reviewed.   Constitutional:       General: She is not in acute distress.     Appearance: She is well-developed.   HENT:      Head: Normocephalic and atraumatic.   Eyes:      Conjunctiva/sclera: Conjunctivae normal.      Pupils: Pupils are equal, round, and reactive to light.   Neck:      Vascular: No carotid bruit, hepatojugular reflux or JVD.      Trachea: No tracheal deviation.      Comments: Nl. Carotid upstrokes  The patient has tenderness along the anterior margin of the sternocleidomastoid muscle.  There is no palpable masses no lymphadenopathy` and there are no bruits.  Cardiovascular:      Rate and Rhythm: Normal rate and regular rhythm.      Pulses:           Radial pulses are 2+ on the right side and 2+ on the left side.      Heart sounds: Normal heart sounds, S1 normal and S2 normal. No murmur heard.     No friction rub. S4 sounds present. No S3 sounds.   Pulmonary:      Effort: Pulmonary effort is normal.      Breath sounds: Normal breath sounds. No wheezing, rhonchi or rales.      Comments: Nl. Expir. Phase  Nl. Breath sound intensity    Abdominal:      General: Bowel sounds are normal. There is no distension or abdominal bruit.      Palpations: Abdomen is soft. There is no mass.      Tenderness: There is no abdominal tenderness. There is no guarding or rebound.      Comments: No organomegaly   Musculoskeletal:         General: No tenderness or deformity. Normal range of motion.      Cervical back: Normal range " of motion and neck supple.      Right lower leg: No edema.      Left lower leg: No edema.      Comments: LLE, no edema, palpable pedal pulses  RLE, no edema, palpable PT pedal pulse     Skin:     General: Skin is warm and dry.      Coloration: Skin is not pale.      Findings: No erythema or rash.   Neurological:      Mental Status: She is alert and oriented to person, place, and time.   Psychiatric:         Behavior: Behavior normal.         Thought Content: Thought content normal.         Judgment: Judgment normal.         Procedures      Assessment & Plan   #1.  Minor nonobstructive coronary disease.  I do not believe the patient's current neck symptoms are an anginal equivalent.  I have asked the patient to follow-up with Oriana Green to assess for noncardiovascular etiologies.  If that assessment is negative and the patient has persistent symptoms you need to reassess with regard to ischemia.  We will continue current medications for present.    2.  Systemic hypertension.  The patient describes that she feels unwell approximately 1 hour after taking losartan but she tolerates her other medications without difficulty.  We will change losartan 25 mg daily to olmesartan 5 mg daily and follow clinical response and blood pressures.    3.  Peripheral arterial disease.  I do not believe the patient's lower extremity symptoms are claudication but are much more compatible with radiculopathy.  She has bilateral palpable pulses in both lower extremities.  We will defer further evaluation for humeral obstructive arterial disease pending clinical course.    4.  Dyslipidemia.  We discussed low carbohydrate low ischemic diet, continued exercise and weight loss.    5.  Diabetes.  The patient would be a good candidate for SGLT2 inhibitor therapy (Jardiance or Farxiga) for cardiac and renal protection in the setting of diabetes.  We will defer to Oriana Green in that regard.    6.  Ms. Noble will follow with Oriana Green as  instructed we will plan on seeing her in follow-up in 1 year or on appearing basis for symptoms as discussed.   Diagnosis Plan   1. Coronary artery disease involving native coronary artery of native heart without angina pectoris        2. Grade I diastolic dysfunction        3. Mixed hyperlipidemia        4. Palpitations        5. PVD (peripheral vascular disease) with claudication        6. Former smoker            No follow-ups on file.         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 Her smoking use included cigarettes. She has a 4.25 pack-year smoking history. She has never used smokeless tobacco.. I have educated her on the risk of diseases from using tobacco products such as cancer, COPD, and heart disease.                               Electronically signed by:    Scribed for Lars Mojica MD by Melinda Davis LPN on February 7, 2024  at 09:13 EST    I, Lars Mojica MD personally performed the services described in this documentation as scribed by the above named individual in my presence, and it is both accurate and complete. February 7, 2024 09:13 EST      Dictated Utilizing Dragon Dictation: Part of this note may be an electronic transcription/translation of spoken language to printed text using the Dragon Dictation System.

## 2024-03-11 ENCOUNTER — TELEPHONE (OUTPATIENT)
Dept: CARDIOLOGY | Facility: CLINIC | Age: 54
End: 2024-03-11

## 2024-03-11 NOTE — TELEPHONE ENCOUNTER
Caller: Kandy Noble    Relationship to patient: Self    Best call back number: [phone]    Chief complaint: FLUTTERING IN CHEST    Type of visit: FOLLOW UP    Requested date:  ASAP    If rescheduling, when is the original appointment:  2-25-24    Additional notes: PATIENT STATES YESTERDAY THAT SHE HAD  HARD FLUTTERING IN HER CHEST AND IT WOULD TAKE HER BREATH AND PATIENT WAS SORE IN CHEST.  PATIENT SAYS SHE'S NOT HAVING CHEST PAIN BUT MORE OF A SORENESS IN HER CHEST AFTER THE EPISODE.  PATIENT SAYS CHEST IS STILL SORE TODAY.  FLUTTERING IN CHEST IS BETTER TODAY BUT SORENESS IN CHEST IS MORE TODAY.  THE POINT WHEN IT WAS WORSE PATIENT WAS VERY NAUSEOUS AND TOP OF STOMACH WAS BOTHERING HER.  PATIENT STATES AFTER EPISODES HER BP IS UP BUT WITHIN 30 MINUTES IT COMES BACK DOWN.         All labs normal.  Blood sugars in  Normal range hgba1c 5.3% normal < 5.8%.  Diet and exercise have cured the insulin resistance at this time.  All other labs normal except slightly low vit d 22. start otc vitamin d 800 - 1000 units daily.

## 2024-04-09 DIAGNOSIS — I73.9 PVD (PERIPHERAL VASCULAR DISEASE) WITH CLAUDICATION: Primary | ICD-10-CM

## 2024-05-08 ENCOUNTER — TELEPHONE (OUTPATIENT)
Dept: CARDIAC SURGERY | Facility: CLINIC | Age: 54
End: 2024-05-08
Payer: COMMERCIAL

## 2024-05-20 DIAGNOSIS — R09.89 LABILE HYPERTENSION: ICD-10-CM

## 2024-05-20 RX ORDER — NEBIVOLOL 2.5 MG/1
2.5 TABLET ORAL DAILY
Qty: 90 TABLET | Refills: 3 | Status: SHIPPED | OUTPATIENT
Start: 2024-05-20

## 2024-05-28 ENCOUNTER — TELEPHONE (OUTPATIENT)
Dept: CARDIOLOGY | Facility: CLINIC | Age: 54
End: 2024-05-28

## 2024-05-28 NOTE — TELEPHONE ENCOUNTER
Caller: Kandy Noble [place]    Relationship: Self    Best call back number: [phone]    What is the best time to reach you: ANY      What was the call regarding: PATIENT HAD A MISSED CALL; NOTHING NOTED IN THE CHART PLEASE CALL BACK    Is it okay if the provider responds through MyChart: NO

## 2024-06-03 ENCOUNTER — OFFICE VISIT (OUTPATIENT)
Dept: CARDIOLOGY | Facility: CLINIC | Age: 54
End: 2024-06-03
Payer: COMMERCIAL

## 2024-06-03 VITALS
WEIGHT: 176 LBS | HEIGHT: 60 IN | BODY MASS INDEX: 34.55 KG/M2 | SYSTOLIC BLOOD PRESSURE: 107 MMHG | OXYGEN SATURATION: 96 % | HEART RATE: 69 BPM | DIASTOLIC BLOOD PRESSURE: 78 MMHG

## 2024-06-03 DIAGNOSIS — R00.2 PALPITATIONS: Primary | ICD-10-CM

## 2024-06-03 DIAGNOSIS — R07.9 CHEST PAIN, UNSPECIFIED TYPE: ICD-10-CM

## 2024-06-03 DIAGNOSIS — R09.89 LABILE HYPERTENSION: ICD-10-CM

## 2024-06-03 PROCEDURE — 93000 ELECTROCARDIOGRAM COMPLETE: CPT | Performed by: PHYSICIAN ASSISTANT

## 2024-06-03 PROCEDURE — 99214 OFFICE O/P EST MOD 30 MIN: CPT | Performed by: PHYSICIAN ASSISTANT

## 2024-06-03 RX ORDER — NEBIVOLOL 5 MG/1
5 TABLET ORAL DAILY
Qty: 30 TABLET | Refills: 11 | Status: SHIPPED | OUTPATIENT
Start: 2024-06-03

## 2024-06-03 NOTE — PROGRESS NOTES
Problem list     Subjective   Kandy Marquez Clothier is a 53 y.o. female     Chief Complaint   Patient presents with    heart flutters    Hypertension   PROBLEM LIST:      1.  Nonobstructive CAD  1.1 stress test 9/25/2020-very mild lateral and posterior lateral wall ischemia, post-rest EF 73%  1.2 left heart cath 10/21/2020-minimal irregularities in the circumflex, LAD and RCA, EF 65%, LVEDP 12-14  1.3 stress test 8/30/2022-no evidence of ischemia, post-rest EF 73%  2.  Hyperlipidemia  3.  Palpitations  3.1 Holter monitor, 1-, NSR / SB, PVCS/PAC's  4.  Family history of early coronary artery disease  5.  Shortness of breath  5.1 Echo/3/2022-EF 56 to 60%, borderline LVH, diastolic dysfunction 1 trivial MR and trivial to mild TR PA in the low 30s  6.  Borderline diabetes mellitus type 2  7.  Peripheral edema  8.  Smoking habituation  9. History of COVID-19 2020; 12/2021  10.  Carotid artery disease  10.1 carotid artery ultrasound 8/3/2022-16 to 49% stenosis both internal carotid arteries, antegrade flow vertebral arteries  11.  Hypertension    HPI    Patient is a 53-year-old female presenting back to the office for routine cardiac assessment but is presenting in the setting of symptoms.    Patient has been experiencing palpitations.  Patient describes that recently, she has done well.  She has felt better than she has in a long time.  She has been over 1 year of being tobacco free.  She has lost a significant amount of weight.  She has felt better than she has in quite some time.    However, she describes feeling palpitations like a fluttering type sensation randomly.  She describes having approximately 4 different instances.  When she feels the palpitations, she feels a substernal discomfort.  It is like an uncomfortable sensation.  It can be a random, transient pain.  It can happen spontaneously and resolved.  It does seem atypical.    She has had chest pain outside of palpitations but describes this being with  palpitations.  She has a degree of dyspnea at times but it is mild.  Her dyspnea is improved significantly since she has stopped smoking.  She does not describe PND or orthopnea.    Palpitations appear to be random and the last time she experienced it, it raised to the point that she consider going to the hospital.  It eventually stopped.  She does not describe any presyncope or syncope.  She is stable otherwise.      Current Outpatient Medications on File Prior to Visit   Medication Sig Dispense Refill    albuterol sulfate  (90 Base) MCG/ACT inhaler prn      aspirin 81 MG tablet Take 1 tablet by mouth Daily. 30 tablet 11    atorvastatin (LIPITOR) 40 MG tablet Take 1 tablet by mouth Daily. 90 tablet 3    docusate sodium (COLACE) 250 MG capsule Take 1 capsule by mouth Daily.      gabapentin (NEURONTIN) 300 MG capsule Take 1 capsule by mouth 3 (Three) Times a Day As Needed.      HYDROcodone-acetaminophen (NORCO) 7.5-325 MG per tablet Take 1 tablet by mouth 3 (Three) Times a Day.      lactulose (CHRONULAC) 10 GM/15ML solution Take 30 mL by mouth. Bid-tid      loratadine (CLARITIN) 10 MG tablet Take 1 tablet by mouth Daily As Needed.      montelukast (SINGULAIR) 10 MG tablet Take 1 tablet by mouth At Night As Needed.      Mounjaro 10 MG/0.5ML solution pen-injector pen Inject 0.5 mL under the skin into the appropriate area as directed 1 (One) Time Per Week.      nitroglycerin (NITROSTAT) 0.4 MG SL tablet 1 under the tongue as needed for angina, may repeat q5mins for up three doses 45 tablet 2    rizatriptan MLT (Maxalt-MLT) 5 MG disintegrating tablet Take 1 tab at onset of Headache and may take another tab 30 minutes if needed with max 2 tabs per 24 hours. 60 tablet 3    Symbicort 160-4.5 MCG/ACT inhaler Inhale 2 puffs 2 (Two) Times a Day.      [DISCONTINUED] nebivolol (BYSTOLIC) 2.5 MG tablet TAKE 1 TABLET BY MOUTH DAILY 90 tablet 3    [DISCONTINUED] olmesartan (Benicar) 5 MG tablet Take 1 tablet by mouth Daily.  30 tablet 11     No current facility-administered medications on file prior to visit.       Lisinopril, Losartan, and Pletal [cilostazol]    Past Medical History:   Diagnosis Date    Abnormal CT of liver 2017    Arthritis     Asthma     B12 deficiency     Back pain     Bronchitis     Chronic kidney disease     kidney stones    COVID      , 2021    COVID-19 vaccine administered     2021,2021, 2022 - Pfizer    Depression     Diabetes mellitus     Diarrhea 2017    Diverticulosis     Epigastric pain 2017    Family history of early CAD 2020    Fatty liver     Generalized headaches     GERD (gastroesophageal reflux disease)     Heartburn 2017    High cholesterol     Hyperlipidemia     Hypertension     Left lower quadrant pain 2017    Consistent with possible diverticulitis.    Lightheadedness 2020    Nausea 2017    Other chest pain 2020    Pancreatitis     Peripheral edema 2020    Pneumonia     Shortness of breath 2020    Wears dentures     UPPER       Social History     Socioeconomic History    Marital status:     Number of children: 7   Tobacco Use    Smoking status: Former     Current packs/day: 0.00     Average packs/day: 0.3 packs/day for 17.0 years (4.3 ttl pk-yrs)     Types: Cigarettes     Start date: 2006     Quit date: 2023     Years since quittin.3    Smokeless tobacco: Never    Tobacco comments:     Pt states that she quit smoking 2023   Vaping Use    Vaping status: Former    Quit date: 2/10/2023   Substance and Sexual Activity    Alcohol use: Not Currently     Comment: social    Drug use: Never    Sexual activity: Defer       Family History   Problem Relation Age of Onset    Diabetes Mother     Liver disease Father     Kidney cancer Father     Heart attack Father     Hypertension Father     Hyperlipidemia Father     Colon cancer Maternal Grandfather        Review of Systems   Constitutional:  Negative for activity  "change, appetite change, chills, fatigue and fever.   HENT:  Negative for congestion and sinus pressure.    Eyes: Negative.  Negative for visual disturbance.   Respiratory: Negative.  Negative for apnea, cough, chest tightness, shortness of breath and wheezing.    Cardiovascular:  Positive for palpitations. Negative for chest pain and leg swelling.   Gastrointestinal: Negative.  Negative for blood in stool.   Endocrine: Negative.  Negative for cold intolerance and heat intolerance.   Genitourinary: Negative.  Negative for hematuria.   Musculoskeletal: Negative.  Negative for gait problem.   Skin: Negative.  Negative for color change, rash and wound.   Allergic/Immunologic: Negative.  Negative for environmental allergies and food allergies.   Neurological:  Positive for headaches. Negative for dizziness, syncope, weakness, light-headedness and numbness.   Hematological: Negative.  Does not bruise/bleed easily.   Psychiatric/Behavioral: Negative.  Negative for sleep disturbance.        Objective   Vitals:    06/03/24 0939   BP: 107/78   BP Location: Left arm   Patient Position: Sitting   Cuff Size: Adult   Pulse: 69   SpO2: 96%   Weight: 79.8 kg (176 lb)   Height: 152.4 cm (60\")      /78 (BP Location: Left arm, Patient Position: Sitting, Cuff Size: Adult)   Pulse 69   Ht 152.4 cm (60\")   Wt 79.8 kg (176 lb)   SpO2 96%   BMI 34.37 kg/m²     Lab Results (most recent)       None            Physical Exam  Vitals and nursing note reviewed.   Constitutional:       General: She is not in acute distress.     Appearance: Normal appearance. She is well-developed.   HENT:      Head: Normocephalic and atraumatic.   Eyes:      General: No scleral icterus.        Right eye: No discharge.         Left eye: No discharge.      Conjunctiva/sclera: Conjunctivae normal.   Neck:      Vascular: No carotid bruit.   Cardiovascular:      Rate and Rhythm: Normal rate and regular rhythm.      Heart sounds: Normal heart sounds. No " murmur heard.     No friction rub. No gallop.   Pulmonary:      Effort: Pulmonary effort is normal. No respiratory distress.      Breath sounds: Normal breath sounds. No wheezing or rales.   Chest:      Chest wall: No tenderness.   Musculoskeletal:      Right lower leg: No edema.      Left lower leg: No edema.   Skin:     General: Skin is warm and dry.      Coloration: Skin is not pale.      Findings: No erythema or rash.   Neurological:      Mental Status: She is alert and oriented to person, place, and time.      Cranial Nerves: No cranial nerve deficit.   Psychiatric:         Behavior: Behavior normal.         Procedure     ECG 12 Lead    Date/Time: 6/3/2024 9:42 AM  Performed by: Edd Sierra PA    Authorized by: Edd Sierra PA  Comparison: compared with previous ECG from 6/28/2023  Comments: EKG demonstrates sinus rhythm at 67 bpm with no acute ST changes             Assessment & Plan     Problems Addressed this Visit          Cardiac and Vasculature    Palpitations - Primary    Relevant Orders    Holter Monitor - 72 Hour Up To 15 Days     Other Visit Diagnoses       Chest pain, unspecified type        Relevant Orders    Holter Monitor - 72 Hour Up To 15 Days    Labile hypertension        Relevant Medications    nebivolol (BYSTOLIC) 5 MG tablet          Diagnoses         Codes Comments    Palpitations    -  Primary ICD-10-CM: R00.2  ICD-9-CM: 785.1     Chest pain, unspecified type     ICD-10-CM: R07.9  ICD-9-CM: 786.50     Labile hypertension     ICD-10-CM: R09.89  ICD-9-CM: 401.9             Recommendations  1.  Patient is a 53-year-old female presenting to the office to be assessed.  Patient with complaints of chest discomfort but mainly with palpitations.  Patient's palpitations appear to be the most worrisome symptom that she has experienced.  We are scheduling a 14-day monitor to evaluate from that standpoint.    2.  I am stopping olmesartan and increasing her Bystolic.  With all of her weight  loss  increase in blood pressure, we might be able to control with one medication and then increased beta-blocker therapy might help suppress palpitations.  We will stop olmesartan.    3.  We discussed other testing.  She would like to start with these medication changes and  consider wearing a Holter monitor.  We will order 14 days as above.    4.  We discussed with her to call us back in 1 to 2 weeks with blood pressure readings as we can make adjustments telephonically if needed.  We will see her back for follow-up on above and recommend further.  Follow-up with primary as scheduled.       Patient did not bring med list or medicine bottles to appointment, med list has been reviewed and updated based on patient's knowledge of their meds.            Electronically signed by:

## 2024-06-03 NOTE — LETTER
Marcia 3, 2024       No Recipients    Patient: Kandy Noble   YOB: 1970   Date of Visit: 6/3/2024       Dear ABA Gupta    Kandy Noble was in my office today. Below is a copy of my note.    If you have questions, please do not hesitate to call me. I look forward to following Kandy along with you.         Sincerely,        DANELLE Jackson        CC:   No Recipients    Problem list     Subjective  Kandy Noble is a 53 y.o. female     Chief Complaint   Patient presents with   • heart flutters   • Hypertension   PROBLEM LIST:      1.  Nonobstructive CAD  1.1 stress test 9/25/2020-very mild lateral and posterior lateral wall ischemia, post-rest EF 73%  1.2 left heart cath 10/21/2020-minimal irregularities in the circumflex, LAD and RCA, EF 65%, LVEDP 12-14  1.3 stress test 8/30/2022-no evidence of ischemia, post-rest EF 73%  2.  Hyperlipidemia  3.  Palpitations  3.1 Holter monitor, 1-, NSR / SB, PVCS/PAC's  4.  Family history of early coronary artery disease  5.  Shortness of breath  5.1 Echo/3/2022-EF 56 to 60%, borderline LVH, diastolic dysfunction 1 trivial MR and trivial to mild TR PA in the low 30s  6.  Borderline diabetes mellitus type 2  7.  Peripheral edema  8.  Smoking habituation  9. History of COVID-19 2020; 12/2021  10.  Carotid artery disease  10.1 carotid artery ultrasound 8/3/2022-16 to 49% stenosis both internal carotid arteries, antegrade flow vertebral arteries  11.  Hypertension    HPI    Patient is a 53-year-old female presenting back to the office for routine cardiac assessment but is presenting in the setting of symptoms.    Patient has been experiencing palpitations.  Patient describes that recently, she has done well.  She has felt better than she has in a long time.  She has been over 1 year of being tobacco free.  She has lost a significant amount of weight.  She has felt better than she has in quite some time.    However, she describes feeling  palpitations like a fluttering type sensation randomly.  She describes having approximately 4 different instances.  When she feels the palpitations, she feels a substernal discomfort.  It is like an uncomfortable sensation.  It can be a random, transient pain.  It can happen spontaneously and resolved.  It does seem atypical.    She has had chest pain outside of palpitations but describes this being with palpitations.  She has a degree of dyspnea at times but it is mild.  Her dyspnea is improved significantly since she has stopped smoking.  She does not describe PND or orthopnea.    Palpitations appear to be random and the last time she experienced it, it raised to the point that she consider going to the hospital.  It eventually stopped.  She does not describe any presyncope or syncope.  She is stable otherwise.      Current Outpatient Medications on File Prior to Visit   Medication Sig Dispense Refill   • albuterol sulfate  (90 Base) MCG/ACT inhaler prn     • aspirin 81 MG tablet Take 1 tablet by mouth Daily. 30 tablet 11   • atorvastatin (LIPITOR) 40 MG tablet Take 1 tablet by mouth Daily. 90 tablet 3   • docusate sodium (COLACE) 250 MG capsule Take 1 capsule by mouth Daily.     • gabapentin (NEURONTIN) 300 MG capsule Take 1 capsule by mouth 3 (Three) Times a Day As Needed.     • HYDROcodone-acetaminophen (NORCO) 7.5-325 MG per tablet Take 1 tablet by mouth 3 (Three) Times a Day.     • lactulose (CHRONULAC) 10 GM/15ML solution Take 30 mL by mouth. Bid-tid     • loratadine (CLARITIN) 10 MG tablet Take 1 tablet by mouth Daily As Needed.     • montelukast (SINGULAIR) 10 MG tablet Take 1 tablet by mouth At Night As Needed.     • Mounjaro 10 MG/0.5ML solution pen-injector pen Inject 0.5 mL under the skin into the appropriate area as directed 1 (One) Time Per Week.     • nitroglycerin (NITROSTAT) 0.4 MG SL tablet 1 under the tongue as needed for angina, may repeat q5mins for up three doses 45 tablet 2   •  rizatriptan MLT (Maxalt-MLT) 5 MG disintegrating tablet Take 1 tab at onset of Headache and may take another tab 30 minutes if needed with max 2 tabs per 24 hours. 60 tablet 3   • Symbicort 160-4.5 MCG/ACT inhaler Inhale 2 puffs 2 (Two) Times a Day.     • [DISCONTINUED] nebivolol (BYSTOLIC) 2.5 MG tablet TAKE 1 TABLET BY MOUTH DAILY 90 tablet 3   • [DISCONTINUED] olmesartan (Benicar) 5 MG tablet Take 1 tablet by mouth Daily. 30 tablet 11     No current facility-administered medications on file prior to visit.       Lisinopril, Losartan, and Pletal [cilostazol]    Past Medical History:   Diagnosis Date   • Abnormal CT of liver 2017   • Arthritis    • Asthma    • B12 deficiency    • Back pain    • Bronchitis    • Chronic kidney disease     kidney stones   • COVID      , 2021   • COVID-19 vaccine administered     2021,2021, 2022 - Pfizer   • Depression    • Diabetes mellitus    • Diarrhea 2017   • Diverticulosis    • Epigastric pain 2017   • Family history of early CAD 2020   • Fatty liver    • Generalized headaches    • GERD (gastroesophageal reflux disease)    • Heartburn 2017   • High cholesterol    • Hyperlipidemia    • Hypertension    • Left lower quadrant pain 2017    Consistent with possible diverticulitis.   • Lightheadedness 2020   • Nausea 2017   • Other chest pain 2020   • Pancreatitis    • Peripheral edema 2020   • Pneumonia    • Shortness of breath 2020   • Wears dentures     UPPER       Social History     Socioeconomic History   • Marital status:    • Number of children: 7   Tobacco Use   • Smoking status: Former     Current packs/day: 0.00     Average packs/day: 0.3 packs/day for 17.0 years (4.3 ttl pk-yrs)     Types: Cigarettes     Start date: 2006     Quit date: 2023     Years since quittin.3   • Smokeless tobacco: Never   • Tobacco comments:     Pt states that she quit smoking 2023   Vaping Use   • Vaping  "status: Former   • Quit date: 2/10/2023   Substance and Sexual Activity   • Alcohol use: Not Currently     Comment: social   • Drug use: Never   • Sexual activity: Defer       Family History   Problem Relation Age of Onset   • Diabetes Mother    • Liver disease Father    • Kidney cancer Father    • Heart attack Father    • Hypertension Father    • Hyperlipidemia Father    • Colon cancer Maternal Grandfather        Review of Systems   Constitutional:  Negative for activity change, appetite change, chills, fatigue and fever.   HENT:  Negative for congestion and sinus pressure.    Eyes: Negative.  Negative for visual disturbance.   Respiratory: Negative.  Negative for apnea, cough, chest tightness, shortness of breath and wheezing.    Cardiovascular:  Positive for palpitations. Negative for chest pain and leg swelling.   Gastrointestinal: Negative.  Negative for blood in stool.   Endocrine: Negative.  Negative for cold intolerance and heat intolerance.   Genitourinary: Negative.  Negative for hematuria.   Musculoskeletal: Negative.  Negative for gait problem.   Skin: Negative.  Negative for color change, rash and wound.   Allergic/Immunologic: Negative.  Negative for environmental allergies and food allergies.   Neurological:  Positive for headaches. Negative for dizziness, syncope, weakness, light-headedness and numbness.   Hematological: Negative.  Does not bruise/bleed easily.   Psychiatric/Behavioral: Negative.  Negative for sleep disturbance.        Objective  Vitals:    06/03/24 0939   BP: 107/78   BP Location: Left arm   Patient Position: Sitting   Cuff Size: Adult   Pulse: 69   SpO2: 96%   Weight: 79.8 kg (176 lb)   Height: 152.4 cm (60\")      /78 (BP Location: Left arm, Patient Position: Sitting, Cuff Size: Adult)   Pulse 69   Ht 152.4 cm (60\")   Wt 79.8 kg (176 lb)   SpO2 96%   BMI 34.37 kg/m²     Lab Results (most recent)       None            Physical Exam  Vitals and nursing note reviewed. "   Constitutional:       General: She is not in acute distress.     Appearance: Normal appearance. She is well-developed.   HENT:      Head: Normocephalic and atraumatic.   Eyes:      General: No scleral icterus.        Right eye: No discharge.         Left eye: No discharge.      Conjunctiva/sclera: Conjunctivae normal.   Neck:      Vascular: No carotid bruit.   Cardiovascular:      Rate and Rhythm: Normal rate and regular rhythm.      Heart sounds: Normal heart sounds. No murmur heard.     No friction rub. No gallop.   Pulmonary:      Effort: Pulmonary effort is normal. No respiratory distress.      Breath sounds: Normal breath sounds. No wheezing or rales.   Chest:      Chest wall: No tenderness.   Musculoskeletal:      Right lower leg: No edema.      Left lower leg: No edema.   Skin:     General: Skin is warm and dry.      Coloration: Skin is not pale.      Findings: No erythema or rash.   Neurological:      Mental Status: She is alert and oriented to person, place, and time.      Cranial Nerves: No cranial nerve deficit.   Psychiatric:         Behavior: Behavior normal.         Procedure    ECG 12 Lead    Date/Time: 6/3/2024 9:42 AM  Performed by: Edd Sierra PA    Authorized by: Edd Sierra PA  Comparison: compared with previous ECG from 6/28/2023  Comments: EKG demonstrates sinus rhythm at 67 bpm with no acute ST changes             Assessment & Plan    Problems Addressed this Visit          Cardiac and Vasculature    Palpitations - Primary    Relevant Orders    Holter Monitor - 72 Hour Up To 15 Days     Other Visit Diagnoses       Chest pain, unspecified type        Relevant Orders    Holter Monitor - 72 Hour Up To 15 Days    Labile hypertension        Relevant Medications    nebivolol (BYSTOLIC) 5 MG tablet          Diagnoses         Codes Comments    Palpitations    -  Primary ICD-10-CM: R00.2  ICD-9-CM: 785.1     Chest pain, unspecified type     ICD-10-CM: R07.9  ICD-9-CM: 786.50     Labile  hypertension     ICD-10-CM: R09.89  ICD-9-CM: 401.9             Recommendations  1.  Patient is a 53-year-old female presenting to the office to be assessed.  Patient with complaints of chest discomfort but mainly with palpitations.  Patient's palpitations appear to be the most worrisome symptom that she has experienced.  We are scheduling a 14-day monitor to evaluate from that standpoint.    2.  I am stopping olmesartan and increasing her Bystolic.  With all of her weight loss  increase in blood pressure, we might be able to control with one medication and then increased beta-blocker therapy might help suppress palpitations.  We will stop olmesartan.    3.  We discussed other testing.  She would like to start with these medication changes and  consider wearing a Holter monitor.  We will order 14 days as above.    4.  We discussed with her to call us back in 1 to 2 weeks with blood pressure readings as we can make adjustments telephonically if needed.  We will see her back for follow-up on above and recommend further.  Follow-up with primary as scheduled.       Patient did not bring med list or medicine bottles to appointment, med list has been reviewed and updated based on patient's knowledge of their meds.            Electronically signed by:

## 2024-06-04 ENCOUNTER — TELEPHONE (OUTPATIENT)
Dept: CARDIAC SURGERY | Facility: CLINIC | Age: 54
End: 2024-06-04
Payer: COMMERCIAL

## 2024-06-14 ENCOUNTER — TELEPHONE (OUTPATIENT)
Dept: CARDIAC SURGERY | Facility: CLINIC | Age: 54
End: 2024-06-14

## 2024-06-26 DIAGNOSIS — R06.02 SOB (SHORTNESS OF BREATH): ICD-10-CM

## 2024-06-26 DIAGNOSIS — R00.2 PALPITATIONS: ICD-10-CM

## 2024-06-26 DIAGNOSIS — R07.89 CHEST PAIN, ATYPICAL: ICD-10-CM

## 2024-06-27 RX ORDER — ISOSORBIDE MONONITRATE 30 MG/1
30 TABLET, EXTENDED RELEASE ORAL DAILY
Qty: 30 TABLET | Refills: 11 | Status: SHIPPED | OUTPATIENT
Start: 2024-06-27

## 2024-07-22 ENCOUNTER — TELEPHONE (OUTPATIENT)
Dept: CARDIAC SURGERY | Facility: CLINIC | Age: 54
End: 2024-07-22
Payer: COMMERCIAL

## 2024-07-23 ENCOUNTER — TELEPHONE (OUTPATIENT)
Dept: CARDIOLOGY | Facility: CLINIC | Age: 54
End: 2024-07-23
Payer: COMMERCIAL

## 2024-07-23 NOTE — TELEPHONE ENCOUNTER
-PATIENT AWARE OF RECOMMENDATIONS        Holter Monitor - 72 Hour Up To 15 Day ---- Message from Coco JERONIMO sent at 7/22/2024  2:54 PM EDT -----      ----- Message -----  From: Edd Sierra PA  Sent: 7/15/2024  10:15 AM EDT  To: Stacey Saunders MA    Routine follow-up

## 2024-08-09 ENCOUNTER — TELEPHONE (OUTPATIENT)
Dept: CARDIAC SURGERY | Facility: CLINIC | Age: 54
End: 2024-08-09
Payer: COMMERCIAL

## 2024-08-09 NOTE — TELEPHONE ENCOUNTER
Per CC APRN Pt's MARK are WNL she will need to refer back to her PCP to be scheduled with another Vascular surgeon as we no longer accept her insurance [facility] HMO. Pt knows that her upcoming appt with us has been cx for 8/13 she was thankful for the answers from her report.

## 2025-02-13 ENCOUNTER — TELEPHONE (OUTPATIENT)
Dept: CARDIOLOGY | Facility: CLINIC | Age: 55
End: 2025-02-13

## 2025-02-13 DIAGNOSIS — I25.10 CORONARY ARTERY DISEASE INVOLVING NATIVE CORONARY ARTERY OF NATIVE HEART WITHOUT ANGINA PECTORIS: ICD-10-CM

## 2025-02-13 RX ORDER — NITROGLYCERIN 0.4 MG/1
TABLET SUBLINGUAL
Qty: 45 TABLET | Refills: 2 | Status: SHIPPED | OUTPATIENT
Start: 2025-02-13

## 2025-02-13 NOTE — TELEPHONE ENCOUNTER
PT NEEDS A REFILL OF NITRO SENT TO [employer]. PT HAS [facility] HMO AND WE CAN NO LONGER SEE HER. [facility] WILL NOT MAKE THE PT AN APPT UNTIL THEY HAVE MEDICAL RECORDS. I PRINTED LAST NOTE AND TESTING FOR PT TO TAKE TO DR. MACK'S OFFICE AND AS WELL AS SEND A REQUEST TO [facility] FOR ALL RECORDS.

## 2025-05-23 DIAGNOSIS — R09.89 LABILE HYPERTENSION: ICD-10-CM

## 2025-05-23 RX ORDER — NEBIVOLOL 5 MG/1
5 TABLET ORAL DAILY
Qty: 90 TABLET | Refills: 3 | Status: SHIPPED | OUTPATIENT
Start: 2025-05-23